# Patient Record
Sex: FEMALE | Race: WHITE | NOT HISPANIC OR LATINO | Employment: OTHER | URBAN - METROPOLITAN AREA
[De-identification: names, ages, dates, MRNs, and addresses within clinical notes are randomized per-mention and may not be internally consistent; named-entity substitution may affect disease eponyms.]

---

## 2019-09-10 PROBLEM — G20 PARKINSON DISEASE (HCC): Chronic | Status: ACTIVE | Noted: 2019-09-10

## 2019-09-11 ENCOUNTER — TELEPHONE (OUTPATIENT)
Dept: INTERNAL MEDICINE CLINIC | Facility: CLINIC | Age: 79
End: 2019-09-11

## 2019-09-11 ENCOUNTER — OFFICE VISIT (OUTPATIENT)
Dept: INTERNAL MEDICINE CLINIC | Facility: CLINIC | Age: 79
End: 2019-09-11
Payer: COMMERCIAL

## 2019-09-11 VITALS — SYSTOLIC BLOOD PRESSURE: 130 MMHG | HEART RATE: 81 BPM | DIASTOLIC BLOOD PRESSURE: 80 MMHG | OXYGEN SATURATION: 95 %

## 2019-09-11 DIAGNOSIS — Z00.00 ADULT GENERAL MEDICAL EXAMINATION: ICD-10-CM

## 2019-09-11 DIAGNOSIS — Z99.3 WHEELCHAIR BOUND: ICD-10-CM

## 2019-09-11 DIAGNOSIS — F33.2 SEVERE EPISODE OF RECURRENT MAJOR DEPRESSIVE DISORDER, WITHOUT PSYCHOTIC FEATURES (HCC): Primary | Chronic | ICD-10-CM

## 2019-09-11 DIAGNOSIS — F41.9 ANXIETY: ICD-10-CM

## 2019-09-11 DIAGNOSIS — G20 PARKINSON DISEASE (HCC): Chronic | ICD-10-CM

## 2019-09-11 DIAGNOSIS — Z23 NEED FOR INFLUENZA VACCINATION: ICD-10-CM

## 2019-09-11 DIAGNOSIS — F51.01 PRIMARY INSOMNIA: ICD-10-CM

## 2019-09-11 PROCEDURE — G0008 ADMIN INFLUENZA VIRUS VAC: HCPCS | Performed by: INTERNAL MEDICINE

## 2019-09-11 PROCEDURE — 99204 OFFICE O/P NEW MOD 45 MIN: CPT | Performed by: INTERNAL MEDICINE

## 2019-09-11 PROCEDURE — 90662 IIV NO PRSV INCREASED AG IM: CPT | Performed by: INTERNAL MEDICINE

## 2019-09-11 RX ORDER — SERTRALINE HYDROCHLORIDE 25 MG/1
25 TABLET, FILM COATED ORAL DAILY
COMMUNITY
End: 2019-09-11 | Stop reason: SDUPTHER

## 2019-09-11 RX ORDER — LANOLIN ALCOHOL/MO/W.PET/CERES
6 CREAM (GRAM) TOPICAL
Qty: 60 TABLET | Refills: 5 | Status: SHIPPED | OUTPATIENT
Start: 2019-09-11 | End: 2019-09-27 | Stop reason: SDUPTHER

## 2019-09-11 RX ORDER — ACETAMINOPHEN 325 MG/1
325 TABLET ORAL EVERY 6 HOURS PRN
Qty: 90 TABLET | Refills: 5 | Status: SHIPPED | OUTPATIENT
Start: 2019-09-11 | End: 2022-01-01 | Stop reason: SDUPTHER

## 2019-09-11 RX ORDER — LORATADINE 10 MG/1
10 TABLET ORAL DAILY
Qty: 30 TABLET | Refills: 5 | Status: SHIPPED | OUTPATIENT
Start: 2019-09-11 | End: 2020-01-09 | Stop reason: ALTCHOICE

## 2019-09-11 RX ORDER — THIAMINE MONONITRATE (VIT B1) 100 MG
100 TABLET ORAL DAILY
COMMUNITY
End: 2019-09-11 | Stop reason: SDUPTHER

## 2019-09-11 RX ORDER — LANOLIN ALCOHOL/MO/W.PET/CERES
3 CREAM (GRAM) TOPICAL
COMMUNITY
End: 2019-09-11 | Stop reason: SDUPTHER

## 2019-09-11 RX ORDER — ACETAMINOPHEN 325 MG/1
325 TABLET ORAL EVERY 6 HOURS PRN
COMMUNITY
End: 2019-09-11 | Stop reason: SDUPTHER

## 2019-09-11 RX ORDER — PROPRANOLOL HYDROCHLORIDE 20 MG/1
20 TABLET ORAL 2 TIMES DAILY
Qty: 60 TABLET | Refills: 5 | Status: SHIPPED | OUTPATIENT
Start: 2019-09-11

## 2019-09-11 RX ORDER — ZINC SULFATE 50(220)MG
220 CAPSULE ORAL DAILY
Qty: 30 CAPSULE | Refills: 5 | Status: SHIPPED | OUTPATIENT
Start: 2019-09-11

## 2019-09-11 RX ORDER — PROPRANOLOL HYDROCHLORIDE 20 MG/1
20 TABLET ORAL 2 TIMES DAILY
COMMUNITY
End: 2019-09-11 | Stop reason: SDUPTHER

## 2019-09-11 RX ORDER — CARBIDOPA/LEVODOPA 25MG-250MG
1 TABLET ORAL 3 TIMES DAILY
Qty: 90 TABLET | Refills: 5 | Status: SHIPPED | OUTPATIENT
Start: 2019-09-11 | End: 2020-01-09 | Stop reason: DRUGHIGH

## 2019-09-11 RX ORDER — THIAMINE MONONITRATE (VIT B1) 100 MG
100 TABLET ORAL DAILY
Qty: 30 TABLET | Refills: 5 | Status: SHIPPED | OUTPATIENT
Start: 2019-09-11

## 2019-09-11 RX ORDER — BUSPIRONE HYDROCHLORIDE 5 MG/1
5 TABLET ORAL 3 TIMES DAILY
Qty: 90 TABLET | Refills: 5 | Status: SHIPPED | OUTPATIENT
Start: 2019-09-11 | End: 2022-01-01 | Stop reason: SDUPTHER

## 2019-09-11 RX ORDER — LORAZEPAM 0.5 MG/1
0.5 TABLET ORAL
Qty: 30 TABLET | Refills: 3 | Status: SHIPPED | OUTPATIENT
Start: 2019-09-11 | End: 2019-09-27 | Stop reason: SDUPTHER

## 2019-09-11 RX ORDER — CARBIDOPA AND LEVODOPA 25; 100 MG/1; MG/1
1 TABLET, EXTENDED RELEASE ORAL
Qty: 30 TABLET | Refills: 5 | Status: SHIPPED | OUTPATIENT
Start: 2019-09-11 | End: 2019-09-27 | Stop reason: SDUPTHER

## 2019-09-11 RX ORDER — BUSPIRONE HYDROCHLORIDE 5 MG/1
5 TABLET ORAL 3 TIMES DAILY
COMMUNITY
End: 2019-09-11 | Stop reason: SDUPTHER

## 2019-09-11 RX ORDER — SERTRALINE HYDROCHLORIDE 25 MG/1
25 TABLET, FILM COATED ORAL DAILY
Qty: 30 TABLET | Refills: 5 | Status: SHIPPED | OUTPATIENT
Start: 2019-09-11 | End: 2020-04-10 | Stop reason: SDUPTHER

## 2019-09-11 RX ORDER — LANOLIN ALCOHOL/MO/W.PET/CERES
3 CREAM (GRAM) TOPICAL
Qty: 60 TABLET | Refills: 5 | Status: SHIPPED | OUTPATIENT
Start: 2019-09-11 | End: 2019-09-11

## 2019-09-11 RX ORDER — ZINC SULFATE 50(220)MG
220 CAPSULE ORAL DAILY
COMMUNITY
End: 2019-09-11 | Stop reason: SDUPTHER

## 2019-09-11 NOTE — PATIENT INSTRUCTIONS
Fall Prevention   WHAT YOU NEED TO KNOW:   Fall prevention includes ways to make your home and other areas safer  It also includes ways you can move more carefully to prevent a fall  Health conditions that cause changes in your blood pressure, vision, or muscle strength and coordination may increase your risk for falls  Medicines may also increase your risk for falls if they make you dizzy, weak, or sleepy  DISCHARGE INSTRUCTIONS:   Call 911 or have someone else call if:   · You have fallen and are unconscious  · You have fallen and cannot move part of your body  Contact your healthcare provider if:   · You have fallen and have pain or a headache  · You have questions or concerns about your condition or care  Fall prevention tips:   · Stand or sit up slowly  This may help you keep your balance and prevent falls  · Use assistive devices as directed  Your healthcare provider may suggest that you use a cane or walker to help you keep your balance  You may need to have grab bars put in your bathroom near the toilet or in the shower  · Wear shoes that fit well and have soles that   Wear shoes both inside and outside  Use slippers with good   Do not wear shoes with high heels  · Wear a personal alarm  This is a device that allows you to call 911 if you fall and need help  Ask your healthcare provider for more information  · Stay active  Exercise can help strengthen your muscles and improve your balance  Your healthcare provider may recommend water aerobics or walking  He or she may also recommend physical therapy to improve your coordination  Never start an exercise program without talking to your healthcare provider first      · Manage your medical conditions  Keep all appointments with your healthcare providers  Visit your eye doctor as directed  Home safety tips:   · Add items to prevent falls in the bathroom    Put nonslip strips on your bath or shower floor to prevent you from slipping  Use a bath mat if you do not have carpet in the bathroom  This will prevent you from falling when you step out of the bath or shower  Use a shower seat so you do not need to stand while you shower  Sit on the toilet or a chair in your bathroom to dry yourself and put on clothing  This will prevent you from losing your balance from drying or dressing yourself while you are standing  · Keep paths clear  Remove books, shoes, and other objects from walkways and stairs  Place cords for telephones and lamps out of the way so that you do not need to walk over them  Tape them down if you cannot move them  Remove small rugs  If you cannot remove a rug, secure it with double-sided tape  This will prevent you from tripping  · Install bright lights in your home  Use night lights to help light paths to the bathroom or kitchen  Always turn on the light before you start walking  · Keep items you use often on shelves within reach  Do not use a step stool to help you reach an item  · Paint or place reflective tape on the edges of your stairs  This will help you see the stairs better  Follow up with your healthcare provider as directed:  Write down your questions so you remember to ask them during your visits  © 2017 [employer] Information is for End User's use only and may not be sold, redistributed or otherwise used for commercial purposes  All illustrations and images included in CareNotes® are the copyrighted property of A D A M , Inc  or Tino Moraes  The above information is an  only  It is not intended as medical advice for individual conditions or treatments  Talk to your doctor, nurse or pharmacist before following any medical regimen to see if it is safe and effective for you

## 2019-09-11 NOTE — PROGRESS NOTES
INTERNAL MEDICINE INITIAL OFFICE VISIT  [facility] - [facility] OF [address] [place] [address]    NAME: Kandice Montemayor  AGE: 67 y o  SEX: female  : 1940     DATE: 2019     Assessment and Plan:     1  Severe episode of recurrent major depressive disorder, without psychotic features ([address] )    She should continue sertraline as prescribed  She could benefit from cognitive behavorial therapy  Usually see this with Parkinson  - sertraline (ZOLOFT) 25 mg tablet; Take 1 tablet (25 mg total) by mouth daily  Dispense: 30 tablet; Refill: 5  - CBC; Future  - Comprehensive metabolic panel; Future  - TSH, 3rd generation; Future    2  Parkinson disease [facility])    Will refer to neurology and get set up with home health  Sinemet was refilled to pharmacy  Her dose will be increased  She remain at increased risk for future falls  She should not ambulate without assistance  She would benefit from home PT/OT/skilled nursing/Speech therapy  She meets homebound criteria  - carbidopa-levodopa (SINEMET)  mg per tablet; Take 1 tablet by mouth 3 (three) times a day  Dispense: 90 tablet; Refill: 5  - carbidopa-levodopa (SINEMET CR)  mg TBCR per ER tablet; Take 1 tablet by mouth daily at bedtime  Dispense: 30 tablet; Refill: 5  - Ambulatory referral to Neurology; Future  - Ambulatory Referral to Home Health; Future    3  Anxiety    Continue medications as prescribed  [place] PDMP was [place]  There were no recent prescriptions noted  Will add on low dose lorazepam to help her with anxiety and sleep     - busPIRone (BUSPAR) 5 mg tablet; Take 1 tablet (5 mg total) by mouth 3 (three) times a day  Dispense: 90 tablet; Refill: 5  - propranolol (INDERAL) 20 mg tablet; Take 1 tablet (20 mg total) by mouth 2 (two) times a day  Dispense: 60 tablet; Refill: 5  - LORazepam (ATIVAN) 0 5 mg tablet; Take 1 tablet (0 5 mg total) by mouth daily at bedtime  Dispense: 30 tablet; Refill: 3    4   Primary insomnia    Can continue melatonin as previously prescribed  Would try to avoid napping during day  Use lorazepam prn     - melatonin 3 mg; Take 2 tablet (3 mg total) by mouth daily at bedtime  Dispense: 60 tablet; Refill: 5    5  Wheelchair bound  - Ambulatory Referral to [address] [place] [address]; Future    6  Need for influenza vaccination  - influenza vaccine, 1394-7979, high-dose, PF 0 5 mL (FLUZONE HIGH-DOSE)    7  Adult general medical examination  - zinc sulfate (ZINCATE) 220 mg capsule; Take 1 capsule (220 mg total) by mouth daily  Dispense: 30 capsule; Refill: 5  - thiamine (VITAMIN B1) 100 mg tablet; Take 1 tablet (100 mg total) by mouth daily  Dispense: 30 tablet; Refill: 5  - acetaminophen (TYLENOL) 325 mg tablet; Take 1 tablet (325 mg total) by mouth every 6 (six) hours as needed for mild pain  Dispense: 90 tablet; Refill: 5  - loratadine (CLARITIN) 10 mg tablet; Take 1 tablet (10 mg total) by mouth daily  Dispense: 30 tablet; Refill: 5    BMI Counseling: BMI was not able to be calculated due to patient refusing height and/or weight  Return in about 2 months (around 11/11/2019) for Initial AWV  Chief Complaint:     Chief Complaint   Patient presents with   [facility]     new pt      History of Present Illness:     Patient presents to establish care with myself  Currently, she is living at [facility]  Her medical history and medications were reviewed with patient's daughter  She has underlying history of Parkinson disease, major recurrent depression, anxiety, insomnia, gait disturbance/wheelchair bound  She has very little muscle mass  Was living in [place] and was admitted to hospital in the summer due to fall and deconditioning  She was discharged to short term rehab and now she is residing in [address] [place] [address]  Her daughter needs to get her set up with neurology and PCP  She is concerned that Parkinson disease is worsening  She has poor food intake at times and has been losing weight   She is not currently supplementing with boost or ensure  Patient denies any significant pain, muscle spasms, or muscle rigidity  She does not ambulate  She requires assistance to ambulate and is mostly sedentary/wheelchair bound  Sometimes has difficulty swallowing her food  She does not feel hungry much of the time  Anxiety has worsened living in a new home and she has had difficulty sleeping  Denies cogwheel rigidity  Denies involuntary contractions  Her  also has Parkinson disease  Denies any dementia diagnosis  Denies any history of heart attack or stroke  No recent studies are available for review  She has been hospitalized in the past as well for low sodium and had to be admitted to ICU  This was likely due to poor solute intake from what it sounds like  Review of Systems:     Review of Systems   Constitutional: Positive for activity change, appetite change and unexpected weight change (Weight loss)  Negative for chills, fatigue and fever  HENT: Negative for congestion, hearing loss, postnasal drip, rhinorrhea, sore throat, tinnitus and trouble swallowing  Eyes: Negative for pain, discharge, redness and visual disturbance  Respiratory: Negative for cough, chest tightness, shortness of breath and wheezing  Cardiovascular: Negative for chest pain, palpitations and leg swelling  Gastrointestinal: Negative for abdominal distention, abdominal pain, blood in stool, constipation, diarrhea, nausea and vomiting  Endocrine: Negative for cold intolerance, heat intolerance, polydipsia, polyphagia and polyuria  Genitourinary: Negative for difficulty urinating, dysuria, frequency, hematuria and urgency  Musculoskeletal: Positive for gait problem  Negative for arthralgias, back pain, joint swelling, myalgias, neck pain and neck stiffness  Skin: Negative for color change and rash  Neurological: Positive for tremors and weakness   Negative for dizziness, seizures, syncope, speech difficulty, light-headedness, numbness and headaches  Hematological: Negative for adenopathy  Does not bruise/bleed easily  Psychiatric/Behavioral: Positive for behavioral problems and sleep disturbance  Negative for agitation, confusion, hallucinations and suicidal ideas  The patient is nervous/anxious  Past Medical History:     Past Medical History:   Diagnosis Date    Anxiety     Depressed     Gait disturbance     Parkinson disease ([address] )       Past Surgical History:     Past Surgical History:   Procedure Laterality Date    SPINAL FUSION  1960      Social History:   She reports that she has never smoked  She has never used smokeless tobacco  She reports that she drinks alcohol  She reports that she has current or past drug history  Family History:     Family History   Problem Relation Age of Onset    Parkinsonism Family       Current Medications:     Current Outpatient Medications:     acetaminophen (TYLENOL) 325 mg tablet, Take 325 mg by mouth every 6 (six) hours as needed for mild pain, Disp: , Rfl:     busPIRone (BUSPAR) 5 mg tablet, Take 5 mg by mouth 3 (three) times a day, Disp: , Rfl:     carbidopa-levodopa (SINEMET)  mg per tablet, Take 1 tablet by mouth daily, Disp: , Rfl:     melatonin 3 mg, Take 3 mg by mouth daily at bedtime, Disp: , Rfl:     propranolol (INDERAL) 20 mg tablet, Take 20 mg by mouth 2 (two) times a day, Disp: , Rfl:     sertraline (ZOLOFT) 25 mg tablet, Take 25 mg by mouth daily, Disp: , Rfl:     thiamine (VITAMIN B1) 100 mg tablet, Take 100 mg by mouth daily, Disp: , Rfl:     zinc sulfate (ZINCATE) 220 mg capsule, Take 220 mg by mouth daily, Disp: , Rfl:      Allergies: Allergies   Allergen Reactions    Penicillins Itching      Physical Exam:     /80 (BP Location: Left arm, Patient Position: Sitting, Cuff Size: Standard)   Pulse 81   SpO2 95%     Physical Exam   Constitutional: She appears well-developed  No distress     Appears malnourished/low body weight   Eyes: Conjunctivae are normal  Right eye exhibits no discharge  Left eye exhibits no discharge  No scleral icterus  Neck: Neck supple  No JVD present  No thyromegaly present  Cardiovascular: Normal rate, regular rhythm and intact distal pulses  No murmur heard  Pulmonary/Chest: Effort normal and breath sounds normal  No respiratory distress  She has no wheezes  She has no rales  She exhibits no tenderness  Abdominal: Soft  Bowel sounds are normal  She exhibits no distension  There is no tenderness  There is no guarding  Musculoskeletal: Normal range of motion  She exhibits no edema  Lymphadenopathy:     She has no cervical adenopathy  Neurological: She is alert  No cranial nerve deficit  No cogwheel rigidity  Resting tremor  Poor muscle mass   Skin: Skin is warm and dry  She is not diaphoretic  Psychiatric: She has a normal mood and affect  Her behavior is normal    Vitals reviewed      Kalie Vargas DO  [facility]

## 2019-09-12 ENCOUNTER — TELEPHONE (OUTPATIENT)
Dept: INTERNAL MEDICINE CLINIC | Facility: CLINIC | Age: 79
End: 2019-09-12

## 2019-09-12 NOTE — TELEPHONE ENCOUNTER
Mumtaz from [address] called in regards to Bar Garcia, she stated that Bar Garcia resides at [facility]  She requested that we fax over the referral for Home Health there because they work in conjunction with [facility] and also [facility]  They have everything that the patient would need   I faxed referral over to Fax#  [phone]    Any questions please contact Kierra at  [phone]

## 2019-09-27 DIAGNOSIS — G20 PARKINSON DISEASE (HCC): Chronic | ICD-10-CM

## 2019-09-27 DIAGNOSIS — F41.9 ANXIETY: ICD-10-CM

## 2019-09-27 DIAGNOSIS — F51.01 PRIMARY INSOMNIA: ICD-10-CM

## 2019-09-27 RX ORDER — LANOLIN ALCOHOL/MO/W.PET/CERES
6 CREAM (GRAM) TOPICAL
Qty: 60 TABLET | Refills: 5 | Status: SHIPPED | OUTPATIENT
Start: 2019-09-27

## 2019-09-27 RX ORDER — CARBIDOPA AND LEVODOPA 25; 100 MG/1; MG/1
1 TABLET, EXTENDED RELEASE ORAL
Qty: 30 TABLET | Refills: 5 | Status: SHIPPED | OUTPATIENT
Start: 2019-09-27 | End: 2020-05-21 | Stop reason: SDUPTHER

## 2019-09-27 RX ORDER — LORAZEPAM 0.5 MG/1
0.5 TABLET ORAL
Qty: 30 TABLET | Refills: 3 | Status: SHIPPED | OUTPATIENT
Start: 2019-09-27 | End: 2020-01-07 | Stop reason: SDUPTHER

## 2019-09-27 NOTE — TELEPHONE ENCOUNTER
Saira Doshi from the visiting nurses       Patient is in a nursing home , they need new RX  of patients medication with instructions for:    carbidopa-levodopa (SINEMET CR)  mg  LORazepam (ATIVAN) 0 5 mg      melatonin 3 mg       Fax number is [phone] att: Letty Christianson

## 2019-10-02 ENCOUNTER — TELEPHONE (OUTPATIENT)
Dept: INTERNAL MEDICINE CLINIC | Facility: CLINIC | Age: 79
End: 2019-10-02

## 2019-10-02 DIAGNOSIS — K59.09 OTHER CONSTIPATION: Primary | ICD-10-CM

## 2019-10-02 RX ORDER — DOCUSATE SODIUM 100 MG/1
100 CAPSULE, LIQUID FILLED ORAL 2 TIMES DAILY PRN
Qty: 60 CAPSULE | Refills: 5 | Status: SHIPPED | OUTPATIENT
Start: 2019-10-02 | End: 2020-01-09 | Stop reason: ALTCHOICE

## 2019-10-02 NOTE — TELEPHONE ENCOUNTER
Need a verbal for speech therapy they are running a little late getting out to see her  They need the verbal within the next 7 days      Please contact Adam Bowman with a verbal at  [phone]

## 2019-10-02 NOTE — TELEPHONE ENCOUNTER
Rose Mary from [facility] visiting nurses called stating that she went to see Berkley Morales on Sat 9/27 and she had diarrhea  She told Eber Vo that she had taken  imodium and has not had a bowel movement in 4 days      Eber Vo is requesting that Dr Josefina Abdullahi fax over either  colace or miralax to [facility] [address]  Phone [phone]      Any questions for Eber Vo please contact her at   [phone]

## 2019-10-22 ENCOUNTER — TELEPHONE (OUTPATIENT)
Dept: NEUROLOGY | Facility: CLINIC | Age: 79
End: 2019-10-22

## 2019-11-04 ENCOUNTER — TELEPHONE (OUTPATIENT)
Dept: INTERNAL MEDICINE CLINIC | Facility: CLINIC | Age: 79
End: 2019-11-04

## 2019-11-04 NOTE — TELEPHONE ENCOUNTER
Pt's last appt was 9/11  Gaspar Nyhan from [facility] would like a discontinue order if the dr is discontinuing: docusate sodium (COLACE), 100 mg capsule, 100 mg, 2 times daily PRN  It's still on [facility] [place]'s med list for the pt       F# is: [phone]

## 2019-11-06 ENCOUNTER — TELEPHONE (OUTPATIENT)
Dept: NEUROLOGY | Facility: CLINIC | Age: 79
End: 2019-11-06

## 2019-11-06 NOTE — TELEPHONE ENCOUNTER
Best contact number for patient: [phone]    Emergency Contact name and number: Niyah Currie (Same as above)     Referring provider:    Referring provider Dr Maliha Tavarez  Telephone:________________    Primary Care Provider Name:      Reason for Appointment/Dx: parkinsons'     Neurology Location patient would like to be seen: [facility]     Order received? Yes ,new order needs to be put in order is                                                   Records Received? No  If no, explain: daughter will call and get records from previous Neurologist     Have you ever seen another Neurologist? Yes, daughter did not say what [address] [address] [facility] [employer]     ID/Policy #:    Secondary Insurance:    ID/Policy#: Workman's Comp/ Accident/ School  Information      Workman's Comp/Accident/School related?  No    If yes name of Insurance company:    Date of Injury:    Open Claim:no    [address] Name and Telephone Number:    Notes:                   Appointment date:

## 2019-11-07 ENCOUNTER — TELEPHONE (OUTPATIENT)
Dept: INTERNAL MEDICINE CLINIC | Facility: CLINIC | Age: 79
End: 2019-11-07

## 2019-11-07 NOTE — TELEPHONE ENCOUNTER
Patient would like to switch Doctors  From Dr Gonzalez to Dr Ghada Mckenzie    Both the  and daughter see Dr Ghada Mckenzie    Would like one family doctor

## 2019-11-19 ENCOUNTER — TELEPHONE (OUTPATIENT)
Dept: INTERNAL MEDICINE CLINIC | Facility: CLINIC | Age: 79
End: 2019-11-19

## 2019-11-19 DIAGNOSIS — Z99.3 WHEELCHAIR BOUND: Primary | Chronic | ICD-10-CM

## 2019-11-19 DIAGNOSIS — G20 PARKINSON DISEASE (HCC): Chronic | ICD-10-CM

## 2019-11-19 DIAGNOSIS — G20 PARKINSON DISEASE (HCC): ICD-10-CM

## 2019-11-19 DIAGNOSIS — Z99.3 WHEELCHAIR BOUND: Primary | ICD-10-CM

## 2019-11-19 NOTE — TELEPHONE ENCOUNTER
Nanda from [place] [address] [facility] called, pt is declining outpatient physical therapy  She would like to go to [facility] which is in the same facility she is in now  They need an order for physical therapy and pt's demographics   Fax upon completion    Phone [phone]  Fax [phone]

## 2019-11-19 NOTE — TELEPHONE ENCOUNTER
Nanda from [address] [place], [place] [address] called to let us know that patient is done with physical therapy    Maegan Bryant would also like Dr Gonzalez to place an order for a standard wheel chair with gel cushion -patient has been using a transportation wheel chair and it very uncomfortable    Please send to [address]   Fax # [phone] is requesting we call [facility] once faxed over  Phone # [phone]

## 2019-11-20 ENCOUNTER — TELEPHONE (OUTPATIENT)
Dept: INTERNAL MEDICINE CLINIC | Facility: CLINIC | Age: 79
End: 2019-11-20

## 2019-11-21 ENCOUNTER — TELEPHONE (OUTPATIENT)
Dept: INTERNAL MEDICINE CLINIC | Facility: CLINIC | Age: 79
End: 2019-11-21

## 2019-11-21 NOTE — TELEPHONE ENCOUNTER
Nanda from [facility] an order sent to [address] [place] [address] so patient can get the wheelchair delivered by tomorrow,     Order needs to say : patients name, state please supply 16x18 wheelchair w/gel cushion    [facility] did not realize how small the patient was and the 1st wheelchair would not have worked    Fax # [phone] phone # [phone]

## 2019-11-21 NOTE — TELEPHONE ENCOUNTER
Patient is being DC from [address] [place], [place] [address]A today 11/21- out patient is too much for patient, this would be more convenient     Jaems Bowman is asking that we send an order to [employer] would be Dilshad Mascorro at [facility] # [phone]  Fax # [phone]    Needs intake form, diagnosis (parkinsons & weakness)

## 2019-12-05 ENCOUNTER — APPOINTMENT (OUTPATIENT)
Dept: LAB | Facility: CLINIC | Age: 79
End: 2019-12-05
Payer: COMMERCIAL

## 2019-12-05 ENCOUNTER — TELEPHONE (OUTPATIENT)
Dept: INTERNAL MEDICINE CLINIC | Facility: CLINIC | Age: 79
End: 2019-12-05

## 2019-12-05 ENCOUNTER — OFFICE VISIT (OUTPATIENT)
Dept: INTERNAL MEDICINE CLINIC | Facility: CLINIC | Age: 79
End: 2019-12-05
Payer: COMMERCIAL

## 2019-12-05 VITALS
HEART RATE: 67 BPM | OXYGEN SATURATION: 98 % | WEIGHT: 64.6 LBS | SYSTOLIC BLOOD PRESSURE: 98 MMHG | DIASTOLIC BLOOD PRESSURE: 60 MMHG

## 2019-12-05 DIAGNOSIS — F33.2 SEVERE EPISODE OF RECURRENT MAJOR DEPRESSIVE DISORDER, WITHOUT PSYCHOTIC FEATURES (HCC): Chronic | ICD-10-CM

## 2019-12-05 DIAGNOSIS — G20 PARKINSON DISEASE (HCC): Primary | Chronic | ICD-10-CM

## 2019-12-05 LAB
ALBUMIN SERPL BCP-MCNC: 4.2 G/DL (ref 3.5–5)
ALP SERPL-CCNC: 83 U/L (ref 46–116)
ALT SERPL W P-5'-P-CCNC: 14 U/L (ref 12–78)
ANION GAP SERPL CALCULATED.3IONS-SCNC: 5 MMOL/L (ref 4–13)
AST SERPL W P-5'-P-CCNC: 16 U/L (ref 5–45)
BILIRUB SERPL-MCNC: 0.52 MG/DL (ref 0.2–1)
BUN SERPL-MCNC: 17 MG/DL (ref 5–25)
CALCIUM SERPL-MCNC: 9.1 MG/DL (ref 8.3–10.1)
CHLORIDE SERPL-SCNC: 106 MMOL/L (ref 100–108)
CO2 SERPL-SCNC: 28 MMOL/L (ref 21–32)
CREAT SERPL-MCNC: 0.51 MG/DL (ref 0.6–1.3)
ERYTHROCYTE [DISTWIDTH] IN BLOOD BY AUTOMATED COUNT: 12.5 % (ref 11.6–15.1)
GFR SERPL CREATININE-BSD FRML MDRD: 92 ML/MIN/1.73SQ M
GLUCOSE P FAST SERPL-MCNC: 98 MG/DL (ref 65–99)
HCT VFR BLD AUTO: 35.9 % (ref 34.8–46.1)
HGB BLD-MCNC: 11.5 G/DL (ref 11.5–15.4)
MCH RBC QN AUTO: 30.9 PG (ref 26.8–34.3)
MCHC RBC AUTO-ENTMCNC: 32 G/DL (ref 31.4–37.4)
MCV RBC AUTO: 97 FL (ref 82–98)
PLATELET # BLD AUTO: 287 THOUSANDS/UL (ref 149–390)
PMV BLD AUTO: 9.1 FL (ref 8.9–12.7)
POTASSIUM SERPL-SCNC: 3.7 MMOL/L (ref 3.5–5.3)
PROT SERPL-MCNC: 6.8 G/DL (ref 6.4–8.2)
RBC # BLD AUTO: 3.72 MILLION/UL (ref 3.81–5.12)
SODIUM SERPL-SCNC: 139 MMOL/L (ref 136–145)
TSH SERPL DL<=0.05 MIU/L-ACNC: 0.47 UIU/ML (ref 0.36–3.74)
WBC # BLD AUTO: 5.97 THOUSAND/UL (ref 4.31–10.16)

## 2019-12-05 PROCEDURE — 36415 COLL VENOUS BLD VENIPUNCTURE: CPT

## 2019-12-05 PROCEDURE — 85027 COMPLETE CBC AUTOMATED: CPT

## 2019-12-05 PROCEDURE — 84443 ASSAY THYROID STIM HORMONE: CPT

## 2019-12-05 PROCEDURE — 99213 OFFICE O/P EST LOW 20 MIN: CPT | Performed by: INTERNAL MEDICINE

## 2019-12-05 PROCEDURE — 80053 COMPREHEN METABOLIC PANEL: CPT

## 2019-12-05 NOTE — PROGRESS NOTES
INTERNAL MEDICINE FOLLOW-UP OFFICE VISIT  [facility] - [facility]    NAME: Sera Napoles  AGE: 67 y o  SEX: female  : 1940     DATE: 2019     Assessment and Plan:     1  Parkinson disease [facility])    Given referral to alternative neurologist  Reprinted lab work to get done  Continue current dose of Sinemet  - Ambulatory referral to Neurology; Future    Return in about 4 months (around 2020) for Subsequent AWV, Follow-up  Chief Complaint:     Chief Complaint   Patient presents with    Medication Problem     *review        History of Present Illness:     Patient has been more fatigued lately  She is underweight and has parkinson disease and severe depression  She does ok most of the day but then her energy level really falls off by 6 pm and she needs to go to bed  She has some dyskinesias at night but not during day  She denies any restless leg symptoms  She denies any hallucinations  Daughter has had difficulty getting her in with neurologist with [facility]  She has a low volume, expressionless voice  Review of Systems:     Review of Systems   Constitutional: Positive for activity change and fatigue  Respiratory: Negative  Cardiovascular: Negative  Gastrointestinal: Negative  Musculoskeletal: Positive for gait problem  Neurological: Positive for weakness  Problem List:     Patient Active Problem List   Diagnosis    Parkinson disease ([facility] )    Severe episode of recurrent major depressive disorder, without psychotic features ([facility] )    Wheelchair bound    Anxiety    Primary insomnia      Objective:     BP 98/60 (BP Location: Left arm, Patient Position: Sitting, Cuff Size: Standard)   Pulse 67   Wt 29 3 kg (64 lb 9 6 oz) Comment: w/ shoes denied off  SpO2 98%     Physical Exam   Constitutional: No distress  Underweight, cachetic female   Cardiovascular: Normal rate and regular rhythm     Pulmonary/Chest: Effort normal and breath sounds normal    Abdominal: Soft  Bowel sounds are normal    Musculoskeletal: She exhibits no edema  Neurological: She is alert  Bradykinesia; low volume voice; no resting tremor or muscle rigidity   Skin: She is not diaphoretic       April Cardenas DO  [facility] OF [address] [place] [address]

## 2019-12-05 NOTE — TELEPHONE ENCOUNTER
----- Message from Caryn Olvera DO sent at 12/5/2019  2:56 PM EST -----  Call patient and let her know labs are all normal

## 2019-12-05 NOTE — PATIENT INSTRUCTIONS
Parkinson Disease   WHAT YOU NEED TO KNOW:   What is Parkinson disease (PD)? PD is a long-term movement disorder  The brain cells that control movement start to die and cause changes in how you move, feel, and act  Even though PD may progress and have a severe impact on your daily life, it is not a life-threatening disease  What causes PD? The exact cause of PD is unknown  It may be caused by a problem with how your brain works  A chemical called dopamine helps your brain control your movement, thoughts, and feelings  PD causes brain cells that make dopamine to die, so they cannot make enough dopamine  What increases my risk for PD? · Age 61 years or older    · A family history of PD    · Exposure to chemicals, such as pesticides or herbicides  What are the signs and symptoms of PD? Symptoms often increase and get worse over time  You may have any of the following:  · Tremors (shaking) that go away when you move or sleep    · Trouble moving or getting up from a seated position    · Trouble with small movements, such as buttoning clothing or eating    · Less blinking and facial emotion    · Joint stiffness and jerky movement    · Trouble keeping your balance when standing or changing positions    · Shuffling or hunched position while walking    · Trouble speaking and writing  How is PD diagnosed? Your healthcare provider will ask about your signs and symptoms  He will ask about your medical history and if you have family members with PD  He will examine you and may move your arms or legs to test your muscles  He may check your balance and the way you walk  How is PD treated? There is no cure for PD  The goal of treatment is to help manage your symptoms  You may need any of the following:  · Anti-Parkinson medicines  are used to improve movement problems, such as muscle stiffness, twitches, and restlessness   Your healthcare provider may use several types of this medicine to help manage your symptoms  · Botulinum toxin  may be given as an injection into your muscles to help them relax and be less stiff  · Surgery  may be used to place an electrical device inside your brain during surgery called deep brain stimulation  Deep brain stimulation may help to decrease symptoms, such as tremor and rigidity  Once the device is in your brain, you may turn the device on or off whenever you want  How can I manage PD? · Do not eat foods that are high in protein or dairy  They can cause problems with how some of your medicine works  Ask your healthcare provider how much protein and dairy is safe to eat  He may tell you to eat foods high in fiber to make it easier to have a bowel movement  Examples are cereals, beans, vegetables, and whole-grain breads  Ask if you need to be on a special diet  · Do not drive  unless your healthcare provider says it is okay  · Exercise regularly  A physical therapist teaches you exercises to help improve movement and strength, and to decrease pain  This may help you control your body movements, and keep your balance  · Go to occupational therapy  An occupational therapist teaches you skills to help with your daily activities  Your occupational therapist may help you choose equipment to help you at home and work  He can also suggest ways to keep your home and workplace safe  · Go to speech therapy  A speech therapist may work with you to help you improve your ability to talk or swallow  · Go to counseling  A mental health counselor can help you talk about your feeling about PD  Your family may attend meetings to learn new ways to take better care of both you and themselves  Where can I find support and more information? · [employer]  [address] , [address]  Phone: 7- [phone] - [phone]  Web Address: [employer]  org   · [address], [place] , [address] [place] [address],  [address]  [address] [place] [address] , [address]   Phone: 2- [phone]  Phone: [phone]  Web Address: http://[employer]/  org  When should I seek immediate care? · You feel like hurting or killing yourself or others  · You feel lightheaded, dizzy, or faint  · You have chest pain or shortness of breath  · You have weakness in an arm or leg  · You become confused, or you have difficulty speaking  · You have dizziness, a severe headache, or vision changes  When should I contact my healthcare provider? · You have a fever  · You are not sleeping well or you sleep more than usual     · You cannot eat or are eating more than usual     · You feel that your condition is getting worse  · You have new symptoms since your last appointment  · Your sad feelings or thoughts change the way you function during the day  · You have questions or concerns about your condition or care  CARE AGREEMENT:   You have the right to help plan your care  Learn about your health condition and how it may be treated  Discuss treatment options with your caregivers to decide what care you want to receive  You always have the right to refuse treatment  The above information is an  only  It is not intended as medical advice for individual conditions or treatments  Talk to your doctor, nurse or pharmacist before following any medical regimen to see if it is safe and effective for you  © 2017 [address] Information is for End User's use only and may not be sold, redistributed or otherwise used for commercial purposes  All illustrations and images included in CareNotes® are the copyrighted property of [employer]  or Tino Moares

## 2020-01-07 ENCOUNTER — TELEPHONE (OUTPATIENT)
Dept: NEUROLOGY | Facility: CLINIC | Age: 80
End: 2020-01-07

## 2020-01-07 DIAGNOSIS — F41.9 ANXIETY: ICD-10-CM

## 2020-01-07 RX ORDER — LORAZEPAM 0.5 MG/1
0.5 TABLET ORAL
Qty: 30 TABLET | Refills: 3 | Status: SHIPPED | OUTPATIENT
Start: 2020-01-07 | End: 2020-03-31

## 2020-01-07 NOTE — TELEPHONE ENCOUNTER
Called patient to see if she would be available to come in this Thursday January 9 at 230 to see Dr Sandy Zuniga  No answer  Left voicemail

## 2020-01-09 ENCOUNTER — CONSULT (OUTPATIENT)
Dept: NEUROLOGY | Facility: CLINIC | Age: 80
End: 2020-01-09
Payer: COMMERCIAL

## 2020-01-09 VITALS — DIASTOLIC BLOOD PRESSURE: 62 MMHG | SYSTOLIC BLOOD PRESSURE: 92 MMHG | HEART RATE: 60 BPM

## 2020-01-09 DIAGNOSIS — F33.2 SEVERE EPISODE OF RECURRENT MAJOR DEPRESSIVE DISORDER, WITHOUT PSYCHOTIC FEATURES (HCC): Chronic | ICD-10-CM

## 2020-01-09 DIAGNOSIS — G20 PARKINSON DISEASE (HCC): Chronic | ICD-10-CM

## 2020-01-09 DIAGNOSIS — Z99.3 WHEELCHAIR BOUND: Primary | Chronic | ICD-10-CM

## 2020-01-09 PROCEDURE — 99204 OFFICE O/P NEW MOD 45 MIN: CPT | Performed by: PSYCHIATRY & NEUROLOGY

## 2020-01-09 NOTE — PROGRESS NOTES
Andrew Perez is a 67 y o  female  Chief Complaint   Patient presents with    Parkinson's Disease       Assessment:  1  Parkinson disease ([facility] [address] )          Discussion:  Would recommend an MRI scan of the brain to evaluate for Parkinson's disease since patient has not had any imaging studies, also discussed with patient, her  and daughter regarding different medication options, including dopamine agonist versus increasing the Sinemet to 4 times a day in addition to the controlled release at night time to see how she does to control her wearing off symptoms, they would like to try increasing the Sinemet to 4 times a day, also we could use amantadine in the future if needed and she was advised to take fall safety and aspiration precautions, she is in follow up with her family physician regarding her history of depression and was advised to take safety precautions and to be under constant supervision, we will try to obtain her records from her neurologist, to go to the hospital if has any worsening symptoms and call me otherwise to see me back 2 months and to follow up with her other physicians      Subjective:    HPI   Patient is here for evaluation of her history of Parkinson's disease for the last 10 years, she is accompanied with her  and daughter, according to the  patient has been diagnosed 10 years back and has been on Sinemet 3 times a day immediate release and she takes a controlled release at nighttime, she has been having some wearing off phenomena and especially after 4 hours she would have tremor, she denies having any hallucinations, sometimes the  has seen that she does have some dyskinesias especially at nighttime but no restless leg  Symptoms, she is mostly wheelchair bound and needs help to go to the bathroom, they live in an assisted living facility [facility], and needs help with her ADLs, she denies any swallowing difficulty, no hallucinations, no vision difficulty, she speaks in the low volume expression less voice, no other complaints      Vitals:    01/09/20 1422   BP: 92/62   BP Location: Left arm   Patient Position: Sitting   Cuff Size: Adult   Pulse: 60       Current Medications    Current Outpatient Medications:     acetaminophen (TYLENOL) 325 mg tablet, Take 1 tablet (325 mg total) by mouth every 6 (six) hours as needed for mild pain, Disp: 90 tablet, Rfl: 5    busPIRone (BUSPAR) 5 mg tablet, Take 1 tablet (5 mg total) by mouth 3 (three) times a day, Disp: 90 tablet, Rfl: 5    carbidopa-levodopa (SINEMET CR)  mg TBCR per ER tablet, Take 1 tablet by mouth daily at bedtime, Disp: 30 tablet, Rfl: 5    carbidopa-levodopa (SINEMET)  mg per tablet, Take 1 tablet by mouth 3 (three) times a day, Disp: 90 tablet, Rfl: 5    LORazepam (ATIVAN) 0 5 mg tablet, Take 1 tablet (0 5 mg total) by mouth daily at bedtime, Disp: 30 tablet, Rfl: 3    melatonin 3 mg, Take 2 tablets (6 mg total) by mouth daily at bedtime, Disp: 60 tablet, Rfl: 5    propranolol (INDERAL) 20 mg tablet, Take 1 tablet (20 mg total) by mouth 2 (two) times a day (Patient taking differently: Take 20 mg by mouth 3 (three) times a day ), Disp: 60 tablet, Rfl: 5    sertraline (ZOLOFT) 25 mg tablet, Take 1 tablet (25 mg total) by mouth daily, Disp: 30 tablet, Rfl: 5    thiamine (VITAMIN B1) 100 mg tablet, Take 1 tablet (100 mg total) by mouth daily, Disp: 30 tablet, Rfl: 5    zinc sulfate (ZINCATE) 220 mg capsule, Take 1 capsule (220 mg total) by mouth daily, Disp: 30 capsule, Rfl: 5      Allergies  Penicillins    Past Medical History  Past Medical History:   Diagnosis Date    Anxiety     Depressed     Gait disturbance     Parkinson disease ([address] )          Past Surgical History:  Past Surgical History:   Procedure Laterality Date    SPINAL FUSION  1960         Family History:  Family History   Problem Relation Age of Onset    Parkinsonism Family        Social History:   reports that she has never smoked  She has never used smokeless tobacco  She reports that she drinks alcohol  She reports that she has current or past drug history  I have reviewed the past medical history, surgical history, social and family history, current medications, allergies vitals, review of systems, and updated this information as appropriate today  Objective:    Physical Exam    Neurological Exam    GENERAL:  Cooperative in no acute distress  HEAD and NECK   Head is atraumatic normocephalic with no lesions or masses  Neck is supple with full range of motion    CARDIOVASCULAR  Carotid Arteries-no carotid bruits  NEUROLOGIC:  Mental Status-the patient is awake alert and oriented without aphasia or apraxia, low volume and expression less speech  Cranial Nerves: Visual fields are full to confrontation  Discs are flat  Limited exam as unable to dilate the pupils, Extraocular movements are full without nystagmus  Pupils are 2-1/2 mm and reactive  Face is symmetrical to light touch  Movements of facial expression move symmetrically  Hearing is normal to finger rub bilaterally  Soft palate lifts symmetrically  Shoulder shrug is symmetrical  Tongue is midline without atrophy  Motor: No drift is noted on arm extension  Strength is full in the upper and lower extremities with cogwheeling rigidity left hand worse than the right hand, mild resting tremor  Sensory: Intact to temperature and vibratory sensation in the upper and lower extremities bilaterally  Cortical function is intact  Coordination: Finger to nose testing is performed accurately  Patient is on a wheelchair  Reflexes:    2+ symmetrical   Toes are downgoing  ROS:  Review of Systems   Constitutional: Positive for fatigue  Negative for appetite change, chills and fever  HENT: Positive for trouble swallowing and voice change (losing voice )  Negative for hearing loss and tinnitus  Eyes: Negative  Negative for photophobia, pain and visual disturbance  Respiratory: Positive for shortness of breath  Negative for wheezing  Cardiovascular: Negative  Negative for chest pain and palpitations  Gastrointestinal: Positive for nausea  Negative for vomiting  Endocrine: Negative  Negative for cold intolerance and heat intolerance  Genitourinary: Positive for frequency  Negative for dysuria and urgency  Loss of bladder control     Musculoskeletal: Positive for gait problem and neck pain  Negative for arthralgias, back pain, myalgias and neck stiffness  Skin: Negative  Negative for rash  Allergic/Immunologic: Negative  Neurological: Positive for dizziness, tremors, weakness and numbness (hands and feet)  Negative for seizures, syncope, facial asymmetry, speech difficulty, light-headedness and headaches  Hematological: Negative  Does not bruise/bleed easily  Psychiatric/Behavioral: Positive for sleep disturbance (difficulty falling asleep)  Negative for confusion, decreased concentration and hallucinations

## 2020-01-20 ENCOUNTER — HOSPITAL ENCOUNTER (OUTPATIENT)
Dept: MRI IMAGING | Facility: CLINIC | Age: 80
Discharge: HOME | End: 2020-01-20
Attending: PSYCHIATRY & NEUROLOGY
Payer: MEDICARE

## 2020-01-20 DIAGNOSIS — G20 PARKINSON DISEASE (HCC): ICD-10-CM

## 2020-01-20 PROCEDURE — 70551 MRI BRAIN STEM W/O DYE: CPT

## 2020-01-22 ENCOUNTER — TELEPHONE (OUTPATIENT)
Dept: NEUROLOGY | Facility: CLINIC | Age: 80
End: 2020-01-22

## 2020-01-22 NOTE — TELEPHONE ENCOUNTER
----- Message from Kalyani Carreon MD sent at 1/22/2020  8:41 AM EST -----  Please call the patient regarding her abnormal result    Please have the patient follow up with family physician regarding fluid in her sinuses

## 2020-01-22 NOTE — TELEPHONE ENCOUNTER
Patient's daughter calling back for result  Provided results  She verbalized understanding and will f/u with her mother

## 2020-02-06 ENCOUNTER — TELEPHONE (OUTPATIENT)
Dept: INTERNAL MEDICINE CLINIC | Facility: CLINIC | Age: 80
End: 2020-02-06

## 2020-02-06 NOTE — TELEPHONE ENCOUNTER
According to Dr Dyan Meza note should still be taking the tablet at bedtime as well as the new prescription he had sent in for 4 times per day

## 2020-02-06 NOTE — TELEPHONE ENCOUNTER
Jl Botello from the [address] [facility] [address] [facility]    Confused of directions for carbidopa-levodopa (SINEMET CR)  mg      She was already taking 1 tablet  at bedtime , but Dr Albrecht put a RX for Take 1 tablet by mouth 4 (four) times a day  Wants to know if she should still take the 1 tablet at bedtime

## 2020-02-19 ENCOUNTER — TELEPHONE (OUTPATIENT)
Dept: NEUROLOGY | Facility: CLINIC | Age: 80
End: 2020-02-19

## 2020-02-19 DIAGNOSIS — G20 PARKINSON DISEASE (HCC): Primary | ICD-10-CM

## 2020-02-19 DIAGNOSIS — R13.10 DYSPHAGIA, UNSPECIFIED TYPE: ICD-10-CM

## 2020-02-19 NOTE — TELEPHONE ENCOUNTER
Pt's  Marsha Lin called and states that pt is c/o worsening spasm (arms and legs)  States that spasms is not a new issue but lately it is more active  Freezing? N/A WC bound    Shuffling? N/A WC bound    Recent Falls? No    Any extra movements? Facial grimacing but mostly just spasm  Any Hallucinations? No    What time of day are symptoms worse? 1500 but varies-after buspar spasms gets better    Current medications confirmed as:  Sinemet  1 tab qid ([id number]-10 pm)    Ask how long after each dose do they feel that it "wears off"? 3-4 hours    Does patient have a DBS? No        [phone]   Spoke w/ pt who gave me permission to speak w/ her   Ok to leave detailed message

## 2020-02-20 ENCOUNTER — APPOINTMENT (EMERGENCY)
Dept: CT IMAGING | Facility: HOSPITAL | Age: 80
End: 2020-02-20
Payer: MEDICARE

## 2020-02-20 ENCOUNTER — HOSPITAL ENCOUNTER (EMERGENCY)
Facility: HOSPITAL | Age: 80
Discharge: HOME | End: 2020-02-20
Attending: EMERGENCY MEDICINE | Admitting: EMERGENCY MEDICINE
Payer: MEDICARE

## 2020-02-20 VITALS
TEMPERATURE: 97.9 F | SYSTOLIC BLOOD PRESSURE: 114 MMHG | HEART RATE: 84 BPM | WEIGHT: 64.81 LBS | OXYGEN SATURATION: 94 % | BODY MASS INDEX: 14.03 KG/M2 | DIASTOLIC BLOOD PRESSURE: 63 MMHG | RESPIRATION RATE: 14 BRPM
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
## 2020-02-20 DIAGNOSIS — R10.9 ABDOMINAL PAIN: ICD-10-CM

## 2020-02-20 DIAGNOSIS — D72.829 LEUKOCYTOSIS: ICD-10-CM

## 2020-02-20 DIAGNOSIS — R19.7 DIARRHEA: ICD-10-CM

## 2020-02-20 LAB
ANION GAP SERPL CALCULATED.3IONS-SCNC: 9 MMOL/L (ref 4–13)
BASOPHILS # BLD AUTO: 0.06 THOUSANDS/ΜL (ref 0–0.1)
BASOPHILS NFR BLD AUTO: 0 % (ref 0–1)
BUN SERPL-MCNC: 17 MG/DL (ref 5–25)
CALCIUM SERPL-MCNC: 9.1 MG/DL (ref 8.3–10.1)
CHLORIDE SERPL-SCNC: 96 MMOL/L (ref 100–108)
CO2 SERPL-SCNC: 28 MMOL/L (ref 21–32)
CREAT SERPL-MCNC: 0.59 MG/DL (ref 0.6–1.3)
EOSINOPHIL # BLD AUTO: 0 THOUSAND/ΜL (ref 0–0.61)
EOSINOPHIL NFR BLD AUTO: 0 % (ref 0–6)
ERYTHROCYTE [DISTWIDTH] IN BLOOD BY AUTOMATED COUNT: 12.6 % (ref 11.6–15.1)
GFR SERPL CREATININE-BSD FRML MDRD: 88 ML/MIN/1.73SQ M
GLUCOSE SERPL-MCNC: 134 MG/DL (ref 65–140)
HCT VFR BLD AUTO: 33 % (ref 34.8–46.1)
HGB BLD-MCNC: 10.5 G/DL (ref 11.5–15.4)
IMM GRANULOCYTES # BLD AUTO: 0.26 THOUSAND/UL (ref 0–0.2)
IMM GRANULOCYTES NFR BLD AUTO: 1 % (ref 0–2)
LYMPHOCYTES # BLD AUTO: 0.56 THOUSANDS/ΜL (ref 0.6–4.47)
LYMPHOCYTES NFR BLD AUTO: 2 % (ref 14–44)
MCH RBC QN AUTO: 31.2 PG (ref 26.8–34.3)
MCHC RBC AUTO-ENTMCNC: 31.8 G/DL (ref 31.4–37.4)
MCV RBC AUTO: 98 FL (ref 82–98)
MONOCYTES # BLD AUTO: 1.91 THOUSAND/ΜL (ref 0.17–1.22)
MONOCYTES NFR BLD AUTO: 6 % (ref 4–12)
NEUTROPHILS # BLD AUTO: 28.71 THOUSANDS/ΜL (ref 1.85–7.62)
NEUTS SEG NFR BLD AUTO: 91 % (ref 43–75)
NRBC BLD AUTO-RTO: 0 /100 WBCS
PLATELET # BLD AUTO: 417 THOUSANDS/UL (ref 149–390)
PMV BLD AUTO: 8.7 FL (ref 8.9–12.7)
POTASSIUM SERPL-SCNC: 4 MMOL/L (ref 3.5–5.3)
RBC # BLD AUTO: 3.37 MILLION/UL (ref 3.81–5.12)
SODIUM SERPL-SCNC: 133 MMOL/L (ref 136–145)
WBC # BLD AUTO: 31.5 THOUSAND/UL (ref 4.31–10.16)

## 2020-02-20 PROCEDURE — 36415 COLL VENOUS BLD VENIPUNCTURE: CPT | Performed by: PHYSICIAN ASSISTANT

## 2020-02-20 PROCEDURE — 99284 EMERGENCY DEPT VISIT MOD MDM: CPT

## 2020-02-20 PROCEDURE — 99285 EMERGENCY DEPT VISIT HI MDM: CPT | Performed by: PHYSICIAN ASSISTANT

## 2020-02-20 PROCEDURE — 70450 CT HEAD/BRAIN W/O DYE: CPT

## 2020-02-20 PROCEDURE — 72125 CT NECK SPINE W/O DYE: CPT

## 2020-02-20 PROCEDURE — 80048 BASIC METABOLIC PNL TOTAL CA: CPT | Performed by: PHYSICIAN ASSISTANT

## 2020-02-20 PROCEDURE — 85025 COMPLETE CBC W/AUTO DIFF WBC: CPT | Performed by: PHYSICIAN ASSISTANT

## 2020-02-20 PROCEDURE — 74177 CT ABD & PELVIS W/CONTRAST: CPT

## 2020-02-20 RX ORDER — CARBIDOPA AND LEVODOPA 25; 100 MG/1; MG/1
1 TABLET, EXTENDED RELEASE ORAL ONCE
Status: COMPLETED | OUTPATIENT
Start: 2020-02-20 | End: 2020-02-20

## 2020-02-20 RX ADMIN — CARBIDOPA AND LEVODOPA 1 TABLET: 25; 100 TABLET, EXTENDED RELEASE ORAL at 17:18

## 2020-02-20 RX ADMIN — IOHEXOL 75 ML: 350 INJECTION, SOLUTION INTRAVENOUS at 15:11

## 2020-02-20 NOTE — ED PROVIDER NOTES
History  Chief Complaint   Patient presents with    Fall     pt brought via EMS from [facility] after a unwitnessed fall this morning at 0100  Per EMS pt c/o LLQ abominal pain since fall  Pt denies head strike, denies LOC, denies thinners  71-year-old female with past medical history significant for Parkinson's disease, anxiety, depression and hypothyroidism presents to the emergency department via EMS with chief complaint of fall  Patient reportedly sustained an unwitnessed fall this morning at 1:00 a m  Alanna Cohen [place] [facility]  She is unaware if she hit her head  She reports no loss of consciousness  EMS reports that since her fall she is complaining of some left lower quadrant abdominal pain  Denies blood thinner use  Location of symptoms reported as the abdomen  Quality is reported as cramping pain  Severity reported as moderate  Associated symptoms:  Denies vomiting  Denies upper lower extremity paralysis paresthesia or weakness  Denies fevers  Denies loss of consciousness  Modifying factors:  Nothing has been tried for treatment of symptoms  Patient does not take blood thinners  Context:pt brought via EMS from [facility] after a unwitnessed fall this morning at 0100  Per EMS pt c/o LLQ abominal pain since fall  Pt denies head strike, denies LOC, denies thinners  Reviewed past visits via epic:  Patient with no prior visits to this ed  History provided by:  Patient   used: No        Prior to Admission Medications   Prescriptions Last Dose Informant Patient Reported? Taking?    LORazepam (ATIVAN) 0 5 mg tablet   No Yes   Sig: Take 1 tablet (0 5 mg total) by mouth daily at bedtime   acetaminophen (TYLENOL) 325 mg tablet  Self No Yes   Sig: Take 1 tablet (325 mg total) by mouth every 6 (six) hours as needed for mild pain   busPIRone (BUSPAR) 5 mg tablet  Self No Yes   Sig: Take 1 tablet (5 mg total) by mouth 3 (three) times a day   carbidopa-levodopa (SINEMET CR)  mg TBCR per ER tablet  Self No Yes   Sig: Take 1 tablet by mouth daily at bedtime   carbidopa-levodopa (SINEMET)  mg per tablet   No Yes   Sig: Take 1 tablet by mouth 4 (four) times a day   melatonin 3 mg  Self No Yes   Sig: Take 2 tablets (6 mg total) by mouth daily at bedtime   propranolol (INDERAL) 20 mg tablet  Self No Yes   Sig: Take 1 tablet (20 mg total) by mouth 2 (two) times a day   Patient taking differently: Take 20 mg by mouth 3 (three) times a day    sertraline (ZOLOFT) 25 mg tablet  Self No Yes   Sig: Take 1 tablet (25 mg total) by mouth daily   thiamine (VITAMIN B1) 100 mg tablet  Self No Yes   Sig: Take 1 tablet (100 mg total) by mouth daily   zinc sulfate (ZINCATE) 220 mg capsule  Self No Yes   Sig: Take 1 capsule (220 mg total) by mouth daily      Facility-Administered Medications: None       Past Medical History:   Diagnosis Date    Anxiety     Depressed     Disease of thyroid gland     Gait disturbance     Parkinson disease ([facility] [address] )        Past Surgical History:   Procedure Laterality Date    SPINAL FUSION  1960       Family History   Problem Relation Age of Onset    Parkinsonism Family      I have reviewed and agree with the history as documented  Social History     Tobacco Use    Smoking status: Never Smoker    Smokeless tobacco: Never Used   Substance Use Topics    Alcohol use: Not Currently     Frequency: Monthly or less     Drinks per session: 1 or 2     Binge frequency: Never    Drug use: Not Currently       Review of Systems   Constitutional: Negative for activity change, appetite change, chills, diaphoresis, fatigue, fever and unexpected weight change  HENT: Negative for congestion, dental problem, drooling, ear discharge, ear pain, facial swelling, hearing loss, mouth sores, nosebleeds, postnasal drip, rhinorrhea, sinus pressure, sinus pain, sneezing, sore throat, tinnitus, trouble swallowing and voice change      Eyes: Negative for photophobia, pain, discharge, redness, itching and visual disturbance  Respiratory: Negative for apnea, cough, choking, chest tightness, shortness of breath, wheezing and stridor  Cardiovascular: Negative for chest pain, palpitations and leg swelling  Gastrointestinal: Positive for abdominal pain  Negative for abdominal distention, anal bleeding, blood in stool, constipation, diarrhea, nausea, rectal pain and vomiting  Endocrine: Negative for cold intolerance, heat intolerance, polydipsia, polyphagia and polyuria  Genitourinary: Negative for decreased urine volume, difficulty urinating, dyspareunia, dysuria, enuresis, flank pain, frequency, hematuria, menstrual problem, pelvic pain, urgency, vaginal bleeding, vaginal discharge and vaginal pain  Musculoskeletal: Negative for arthralgias, back pain, gait problem, joint swelling, myalgias, neck pain and neck stiffness  Skin: Negative for color change, pallor, rash and wound  Allergic/Immunologic: Negative for environmental allergies, food allergies and immunocompromised state  Neurological: Positive for tremors  Negative for dizziness, seizures, syncope, facial asymmetry, speech difficulty, weakness, light-headedness, numbness and headaches  Hematological: Negative for adenopathy  Does not bruise/bleed easily  Psychiatric/Behavioral: Negative for agitation, confusion, hallucinations, self-injury and suicidal ideas  The patient is nervous/anxious  The patient is not hyperactive  All other systems reviewed and are negative  Physical Exam  Physical Exam   Constitutional: She is oriented to person, place, and time  No distress  /60 (BP Location: Right arm)   Pulse 81   Temp 97 9 °F (36 6 °C) (Oral)   Wt 29 4 kg (64 lb 13 oz)   SpO2 99%   BMI 14 03 kg/m²     Thin cachetic 67year old female in NAD  HENT:   Head: Normocephalic and atraumatic     Right Ear: External ear normal    Left Ear: External ear normal    Nose: Nose normal  Mouth/Throat: Oropharynx is clear and moist  No oropharyngeal exudate  Eyes: Conjunctivae and EOM are normal  Right eye exhibits no discharge  Left eye exhibits no discharge  No scleral icterus  Neck: Normal range of motion  Neck supple  No JVD present  No tracheal deviation present  Cardiovascular: Normal rate, regular rhythm and intact distal pulses  Pulmonary/Chest: Effort normal and breath sounds normal  No stridor  No respiratory distress  She has no wheezes  She has no rales  She exhibits no tenderness  Abdominal: Soft  Bowel sounds are normal  She exhibits no distension and no mass  There is tenderness  There is no rebound and no guarding  No hernia  Tender to palpation to lower abdomen  No pulsatile masses  No rigidity or guarding  Musculoskeletal: Normal range of motion  She exhibits no edema, tenderness or deformity  Hips are nontender to palpation bilaterally  External of the lower extremities internal role of lower extremities elicits no pain   Lymphadenopathy:     She has no cervical adenopathy  Neurological: She is alert and oriented to person, place, and time  She displays normal reflexes  No cranial nerve deficit  She exhibits abnormal muscle tone  Coordination normal    Resting tremor   Skin: Skin is warm and dry  Capillary refill takes less than 2 seconds  No rash noted  She is not diaphoretic  No erythema  No pallor  Psychiatric: She has a normal mood and affect  Her behavior is normal  Judgment and thought content normal    Nursing note and vitals reviewed        Vital Signs  ED Triage Vitals   Temperature Pulse Respirations Blood Pressure SpO2   02/20/20 1345 02/20/20 1345 02/20/20 1430 02/20/20 1345 02/20/20 1345   97 9 °F (36 6 °C) 81 15 105/60 99 %      Temp Source Heart Rate Source Patient Position - Orthostatic VS BP Location FiO2 (%)   02/20/20 1345 02/20/20 1345 02/20/20 1345 02/20/20 1345 --   Oral Monitor Lying Right arm       Pain Score       02/20/20 1345 6           Vitals:    02/20/20 1500 02/20/20 1530 02/20/20 1540 02/20/20 1600   BP: 112/67 110/58 106/59 114/63   Pulse: 83 84 81 84   Patient Position - Orthostatic VS:   Lying          Visual Acuity  Visual Acuity      Most Recent Value   L Pupil Size (mm)  2   R Pupil Size (mm)  2          ED Medications  Medications   iohexol (OMNIPAQUE) 350 MG/ML injection (MULTI-DOSE) 100 mL (75 mL Intravenous Given 2/20/20 1511)   carbidopa-levodopa (SINEMET CR)  mg per ER tablet 1 tablet (1 tablet Oral Given 2/20/20 1718)       Diagnostic Studies  Results Reviewed     Procedure Component Value Units Date/Time    CBC and differential [[id number]]  (Abnormal) Collected:  02/20/20 1404    Lab Status:  Final result Specimen:  Blood from Arm, Right Updated:  02/20/20 1501     WBC 31 50 Thousand/uL      RBC 3 37 Million/uL      Hemoglobin 10 5 g/dL      Hematocrit 33 0 %      MCV 98 fL      MCH 31 2 pg      MCHC 31 8 g/dL      RDW 12 6 %      MPV 8 7 fL      Platelets 948 Thousands/uL      nRBC 0 /100 WBCs      Neutrophils Relative 91 %      Immat GRANS % 1 %      Lymphocytes Relative 2 %      Monocytes Relative 6 %      Eosinophils Relative 0 %      Basophils Relative 0 %      Neutrophils Absolute 28 71 Thousands/µL      Immature Grans Absolute 0 26 Thousand/uL      Lymphocytes Absolute 0 56 Thousands/µL      Monocytes Absolute 1 91 Thousand/µL      Eosinophils Absolute 0 00 Thousand/µL      Basophils Absolute 0 06 Thousands/µL     Narrative: This is an appended report  These results have been appended to a previously verified report      Basic metabolic panel [[id number]]  (Abnormal) Collected:  02/20/20 1404    Lab Status:  Final result Specimen:  Blood from Arm, Right Updated:  02/20/20 1430     Sodium 133 mmol/L      Potassium 4 0 mmol/L      Chloride 96 mmol/L      CO2 28 mmol/L      ANION GAP 9 mmol/L      BUN 17 mg/dL      Creatinine 0 59 mg/dL      Glucose 134 mg/dL      Calcium 9 1 mg/dL      eGFR 88 ml/min/1 73sq m     Narrative:       National Kidney Disease Foundation guidelines for Chronic Kidney Disease (CKD):     Stage 1 with normal or high GFR (GFR > 90 mL/min/1 73 square meters)    Stage 2 Mild CKD (GFR = 60-89 mL/min/1 73 square meters)    Stage 3A Moderate CKD (GFR = 45-59 mL/min/1 73 square meters)    Stage 3B Moderate CKD (GFR = 30-44 mL/min/1 73 square meters)    Stage 4 Severe CKD (GFR = 15-29 mL/min/1 73 square meters)    Stage 5 End Stage CKD (GFR <15 mL/min/1 73 square meters)  Note: GFR calculation is accurate only with a steady state creatinine                 CT head without contrast   Final Result by Pablo Hoang MD (02/20 1581)      No acute intracranial abnormality  Microangiopathic changes  Workstation performed: [id number]         CT cervical spine without contrast   Final Result by Pablo Hoang MD (02/20 3736)      No cervical spine fracture or traumatic malalignment  Workstation performed: [id number]         CT abdomen pelvis with contrast   Final Result by Pablo Hoang MD (02/20 6715)         1  No definite acute abnormality noted in the abdomen or pelvis  2   Cachexia  3   Colonic diverticulosis without evidence for diverticulitis  4   Nonobstructing 2 mm left lower pole renal collecting system calculus  5   Additional findings as noted  Workstation performed: [id number]                    Procedures  Procedures         ED Course                               MDM  Number of Diagnoses or Management Options  Abdominal pain: new and requires workup  Diarrhea: new and requires workup  Fall, initial encounter: new and requires workup  Leukocytosis: new and requires workup  Diagnosis management comments: ddx includes but is not limited to head injury, neck injury, intraabdominal injury, lumbar compression fracture, ICH, SAH, SDH, concussion, plan ct head/cspine/abd/pelvis for evaluation of injuries       Lab results reviewed - CBC remarkable for significantly elevated WBC of 31 5   hgb 10 5, hct 33 0  MCV normal 98  BMP reviewed - K normal at 4 0,  Bun 17, creatinine 0 59 normal no renal failure  CT head images independently visualized and interpreted by me  Radiology report reviewed:  FINDINGS:    PARENCHYMA: Decreased attenuation is noted in periventricular and subcortical white matter demonstrating an appearance that is statistically most likely to represent mild microangiopathic change  No CT signs of acute infarction   No intracranial mass, mass effect or midline shift   No acute parenchymal hemorrhage    Atherosclerotic vascular calcifications in carotid and vertebral arteries are more advanced than would be expected for the patient's   age  VENTRICLES AND EXTRA-AXIAL SPACES:  Normal for the patient's age  VISUALIZED ORBITS AND PARANASAL SINUSES:  Unremarkable  CALVARIUM AND EXTRACRANIAL SOFT TISSUES:  Normal     CT cervical spine images independently visualized and interpreted by me  Radiology report reviewed:  FINDINGS:    ALIGNMENT:  There is mild left lateral neck flexion with otherwise normal alignment  VERTEBRAE:  No fracture or subluxation  DEGENERATIVE CHANGES:  Mild multilevel cervical degenerative changes are noted without critical central canal stenosis  PREVERTEBRAL AND PARASPINAL SOFT TISSUES:  Unremarkable  THORACIC INLET:  Normal     CT abdomen pelvis images independently visualized and interpreted by me  Radiology report was reviewed:  ABDOMEN    LOWER CHEST:  No clinically significant abnormality identified in the visualized lower chest     LIVER/BILIARY TREE:  The liver is mildly enlarged and demonstrates no focal abnormality  GALLBLADDER:  No calcified gallstones  No pericholecystic inflammatory change  SPLEEN:  Unremarkable  PANCREAS:  Unremarkable  ADRENAL GLANDS:  Unremarkable      KIDNEYS/URETERS: There is a 2 7 x 2 0 cm simple lower pole right renal cyst present   One or more sharply circumscribed subcentimeter renal hypodensities are noted  These lesions are too small to accurately characterize, but are statistically most likely   to represent benign cortical renal cyst(s)   According to the guidelines published in the [facility] Paper of the ACR Incidental Findings Committee (Radiology 2010), no further workup of these lesions is recommended   Nonobstructing 2 mm left lower pole   renal collecting system calculus is present  STOMACH AND BOWEL:  Moderate amount of fecal material present in the colon and rectum   Scattered colonic diverticula are present  Lunette Solange is no evidence for acute diverticulitis   No definite bowel wall thickening is identified   No intestinal   obstruction is noted  APPENDIX:  No findings to suggest appendicitis  ABDOMINOPELVIC CAVITY:  No free fluid or free air noted   There is a paucity of intra-abdominal fat in this cachectic appearing patient   No definite adenopathy noted  VESSELS:  Atherosclerotic calcification of the abdominal aorta and branch vessels which are normal in course and caliber  PELVIS    REPRODUCTIVE ORGANS:  Calcified uterine fibroid   Prominent parametrial vessels left greater than right with prominent left gonadal vein  Michelle Donte URINARY BLADDER:  Displaced to the right of midline are otherwise unremarkable  ABDOMINAL WALL/INGUINAL REGIONS:  Cachectic   No hernia  OSSEOUS STRUCTURES:  Diffuse osteopenia   Fusion of the spinous processes of L3-L4-L5 noted   Approximately 30% loss of height of the superior endplate of L4 likely chronic   No lucent or sclerotic lesions are noted  I reviewed all CT scan results and lab results with the patient, patient's  and patient's daughter at bedside  Discussed all CT scan abnormalities  Discussed elevated white blood cell count  Patient has no other signs of infection  No fevers  No signs of colitis or other source of intra-abdominal infection    I did discuss the elevated white blood cell count and its implications with the patient and daughter at bedside  Offered admission for further evaluation but they declined  [place] [facility] [place] prefer to follow up with her established primary care physician as an outpatient  The daughter and the patient do offer the patient has had some diarrhea recently  She reports she has not had more than 3 episodes of diarrhea per day  I discussed with him the possibility of C diff which might explain the elevated white blood cell count  She is unable to provide a stool sample here in the emergency department  Will given outpatient prescription for stool sample for C diff to be completed as an outpatient  Discussed follow up with primary care physician for recheck in 2-3 days  Extensively discussed reasons to return to ED including any new or developing signs of infection  Reviewed reasons to return to ed  Patient verbalized understanding of diagnosis and agreement with discharge plan of care as well as understanding of reasons to return to ed  Amount and/or Complexity of Data Reviewed  Clinical lab tests: ordered and reviewed  Tests in the radiology section of CPT®: ordered and reviewed  Discussion of test results with the performing providers: yes  Obtain history from someone other than the patient: yes (EMS)  Review and summarize past medical records: yes  Independent visualization of images, tracings, or specimens: yes    Patient Progress  Patient progress: stable        Disposition  Final diagnoses:   Fall, initial encounter   Abdominal pain   Leukocytosis   Diarrhea     Time reflects when diagnosis was documented in both MDM as applicable and the Disposition within this note     Time User Action Codes Description Comment    2/20/2020  5:08 PM Manueltie Fab Add [W47  VYWF] Fall, initial encounter     2/20/2020  5:08 PM Manueltie Fab Add [R10 9] Abdominal pain     2/20/2020 5:09 PM Guillermina Lyman Add [V75 [id number]] Leukocytosis     2/20/2020  5:09 PM Guillermina Lyman Add [R19 7] Diarrhea       ED Disposition     ED Disposition Condition Date/Time Comment    Discharge Stable Thu Feb 20, 2020  5:08 PM Andrew Perez discharge to home/self care  Follow-up Information     Follow up With Specialties Details Why Contact Info Additional [id number] [facility],  Internal Medicine Call in 1 day for further evaluation of symptoms [address] [facility] [address] Emergency Department Emergency Medicine Go to  If symptoms worsen [address] [facility] [address]  [address] [place] [address] ED, [address], [place], [address] [place], MD Neurology Go to  for further evaluation of symptoms 3 Parkinson's [address] [facility]  [phone]             Patient's Medications   Discharge Prescriptions    No medications on file     Outpatient Discharge Orders   Clostridium difficile toxin by PCR with EIA   Standing Status: Future Standing Exp   Date: 02/20/21       PDMP Review       Value Time User    PDMP Reviewed  Yes 1/7/2020  8:44 AM April Cardenas DO          ED Provider  Electronically Signed by           Kath Spicer PA-C  02/20/20 1735

## 2020-02-20 NOTE — ED NOTES
Pt resting comfortably, denies need for further assistance  Pt family reports that she missed her 1200 parkinson's med dose, next dose would be 1700  Pt asks for ice water, kindly deferred for time being to await official CT results       Gloria Mack RN  02/20/20 4717

## 2020-02-24 ENCOUNTER — TELEPHONE (OUTPATIENT)
Dept: SPEECH THERAPY | Facility: CLINIC | Age: 80
End: 2020-02-24

## 2020-02-24 NOTE — TELEPHONE ENCOUNTER
Patient did not show for her 8:00am outpatient skilled Speech Therapy appointment  Clinician attempted to contact patient via telephone  Clinician left a message on patient's voicemail requesting a call back to reschedule today's appointment

## 2020-02-26 ENCOUNTER — OFFICE VISIT (OUTPATIENT)
Dept: NEUROLOGY | Facility: CLINIC | Age: 80
End: 2020-02-26
Payer: MEDICARE

## 2020-02-26 VITALS — SYSTOLIC BLOOD PRESSURE: 110 MMHG | HEART RATE: 71 BPM | DIASTOLIC BLOOD PRESSURE: 60 MMHG

## 2020-02-26 DIAGNOSIS — G20 PARKINSON DISEASE (HCC): Primary | Chronic | ICD-10-CM

## 2020-02-26 DIAGNOSIS — D72.829 LEUKOCYTOSIS, UNSPECIFIED TYPE: ICD-10-CM

## 2020-02-26 PROCEDURE — 1160F RVW MEDS BY RX/DR IN RCRD: CPT | Performed by: PSYCHIATRY & NEUROLOGY

## 2020-02-26 PROCEDURE — 99214 OFFICE O/P EST MOD 30 MIN: CPT | Performed by: PSYCHIATRY & NEUROLOGY

## 2020-02-26 PROCEDURE — 1036F TOBACCO NON-USER: CPT | Performed by: PSYCHIATRY & NEUROLOGY

## 2020-02-26 NOTE — PROGRESS NOTES
Charles Ceja is a 67 y o  female  Chief Complaint   Patient presents with    Parkinson's Disease       Assessment:  1  Parkinson disease ([address] )    2  Leukocytosis, unspecified type          Discussion:  Patient's MRI scan of the brain results were reviewed with the family and the , she had maxillary sinusitis for  which she was advised to follow up with her family physician, also recently she went to the ER after a fall and has diarrhea and increased WBC count and there was a worry about if she has C diff, I explained to the patient and the  that she should have state back in the hospital for them to workup, she did not want to do that, I've advised her to follow up with her family physician a referral was made, she is having either tardive dyskinesia or wearing off phenomenon, she is taking the Sinemet only 4 times a day insert of 5 times a day, I've advised him to take immediate release 4 times a day and the extended release at nighttime and see how she does, she otherwise she may need amantadine in the future, also we're going to have her see our Parkinson's specialist, she was advised to take fall safety and aspiration precautions, also was advised to follow up with her family physician regarding her history of depression, to be under constant supervision, we were not evaluated her for the memory, she may need that in the future, I have still not received the records from her prior neurologist, to go to the hospital if has any worsening symptoms and call me otherwise to see me back in 3 months and follow up with other physicians   I Personally spoke to [facility] nurse at [address] and told him that patient needs to follow up with the family physician regarding the increased WBC count and depression and also regarding her Sinemet that she should be on 4 times a day immediate release and extended release at nighttime and call us if has any issues and to take fall and safety precautions  Subjective:    HPI   Patient is here in follow-up for history of Parkinson's disease for the last several years at least 10 years, she is accompanied with her  and since her last visit she still continues to have the tremor that she describes mostly at rest, she was taking Sinemet 3 times a day and was advised to take it 4 times a day in addition to the extended release, but she is only taking it 4 times a day and not taking the extended release at nighttime, she is experiencing either wearing off phenomenon or tardive dyskinesia, but she says that she is having tremor especially after 4 hours and at nighttime, she denies any restless leg symptoms, she is mostly wheelchair bound and needs help to go to the bathroom, she is living in an assisted living facility [facility] and needs help with her ADLs, family is not keen to have any imaging studies of her spine, she denies any swallowing difficulty, no hallucination, no vision difficulty, she speaks in low volume, no other complaints      Vitals:    02/26/20 1530   BP: 110/60   BP Location: Left arm   Patient Position: Sitting   Cuff Size: Adult   Pulse: 71       Current Medications    Current Outpatient Medications:     acetaminophen (TYLENOL) 325 mg tablet, Take 1 tablet (325 mg total) by mouth every 6 (six) hours as needed for mild pain, Disp: 90 tablet, Rfl: 5    busPIRone (BUSPAR) 5 mg tablet, Take 1 tablet (5 mg total) by mouth 3 (three) times a day, Disp: 90 tablet, Rfl: 5    carbidopa-levodopa (SINEMET CR)  mg TBCR per ER tablet, Take 1 tablet by mouth daily at bedtime, Disp: 30 tablet, Rfl: 5    carbidopa-levodopa (SINEMET)  mg per tablet, Take 1 tablet by mouth 4 (four) times a day, Disp: 120 tablet, Rfl: 3    LORazepam (ATIVAN) 0 5 mg tablet, Take 1 tablet (0 5 mg total) by mouth daily at bedtime, Disp: 30 tablet, Rfl: 3    melatonin 3 mg, Take 2 tablets (6 mg total) by mouth daily at bedtime, Disp: 60 tablet, Rfl: 5    propranolol (INDERAL) 20 mg tablet, Take 1 tablet (20 mg total) by mouth 2 (two) times a day (Patient taking differently: Take 20 mg by mouth 3 (three) times a day ), Disp: 60 tablet, Rfl: 5    sertraline (ZOLOFT) 25 mg tablet, Take 1 tablet (25 mg total) by mouth daily, Disp: 30 tablet, Rfl: 5    thiamine (VITAMIN B1) 100 mg tablet, Take 1 tablet (100 mg total) by mouth daily, Disp: 30 tablet, Rfl: 5    zinc sulfate (ZINCATE) 220 mg capsule, Take 1 capsule (220 mg total) by mouth daily, Disp: 30 capsule, Rfl: 5      Allergies  Penicillins    Past Medical History  Past Medical History:   Diagnosis Date    Anxiety     Depressed     Disease of thyroid gland     Gait disturbance     Parkinson disease ([address] )          Past Surgical History:  Past Surgical History:   Procedure Laterality Date    SPINAL FUSION  1960         Family History:  Family History   Problem Relation Age of Onset    Parkinsonism Family        Social History:   reports that she has never smoked  She has never used smokeless tobacco  She reports that she drank alcohol  She reports that she has current or past drug history  I have reviewed the past medical history, surgical history, social and family history, current medications, allergies vitals, review of systems, and updated this information as appropriate today  Objective:    Physical Exam    Neurological Exam    GENERAL:  Cooperative in no acute distress  Well-developed and well-nourished    HEAD and NECK   Head is atraumatic normocephalic with no lesions or masses  Neck is supple with full range of motion    CARDIOVASCULAR  Carotid Arteries-no carotid bruits  NEUROLOGIC:  Mental Status-the patient is awake alert and oriented without aphasia or apraxia  Cranial Nerves: Visual fields are full to confrontation    Extraocular movements are full without nystagmus  Pupils are 2-1/2 mm and reactive  Face is symmetrical to light touch   Movements of facial expression move symmetrically  Hearing is normal to finger rub bilaterally  Soft palate lifts symmetrically  Shoulder shrug is symmetrical  Tongue is midline without atrophy  Motor: No drift is noted on arm extension  Strength is full in the upper and lower extremities with normal bulk and cogwheeling rigidity left hand worse than the right hand, mild resting tremor  Sensory: Intact to temperature and vibratory sensation in the upper and lower extremities bilaterally  Cortical function is intact  Coordination: Finger to nose testing is performed accurately  Patient is on a wheelchair  Reflexes:  2+ and symmetrical          ROS:  Review of Systems   Constitutional: Negative  Negative for appetite change and fever  HENT: Negative  Negative for hearing loss, tinnitus, trouble swallowing and voice change  Eyes: Negative  Negative for photophobia and pain  Respiratory: Negative  Negative for shortness of breath  Cardiovascular: Negative  Negative for palpitations  Gastrointestinal: Negative  Negative for nausea and vomiting  Endocrine: Negative  Negative for cold intolerance and heat intolerance  Genitourinary: Negative  Negative for dysuria, frequency and urgency  Musculoskeletal: Positive for gait problem  Negative for myalgias and neck pain  Patient states that her balance is off  Skin: Negative  Negative for rash  Neurological: Positive for tremors  Negative for dizziness, seizures, syncope, facial asymmetry, speech difficulty, weakness, light-headedness, numbness and headaches  Patient states that she has bilateral tremors in hand  Patient also stated that she has bilateral leg pain and jerking  Hematological: Negative  Does not bruise/bleed easily  Psychiatric/Behavioral: Negative  Negative for confusion, hallucinations and sleep disturbance

## 2020-02-28 ENCOUNTER — TELEPHONE (OUTPATIENT)
Dept: NEUROLOGY | Facility: CLINIC | Age: 80
End: 2020-02-28

## 2020-03-04 ENCOUNTER — TELEPHONE (OUTPATIENT)
Dept: NEUROLOGY | Facility: CLINIC | Age: 80
End: 2020-03-04

## 2020-03-26 ENCOUNTER — TELEPHONE (OUTPATIENT)
Dept: NEUROLOGY | Facility: CLINIC | Age: 80
End: 2020-03-26

## 2020-03-26 NOTE — TELEPHONE ENCOUNTER
LMOM regarding rescheduling appt from 03/31/20 to 03/27/20 for 10:30 am  Pt to call office to confirm accepting/declining appt  Slot on hold for pt

## 2020-03-30 ENCOUNTER — TELEPHONE (OUTPATIENT)
Dept: NEUROLOGY | Facility: CLINIC | Age: 80
End: 2020-03-30

## 2020-03-30 DIAGNOSIS — G20 PARKINSON DISEASE (HCC): Chronic | ICD-10-CM

## 2020-03-30 NOTE — TELEPHONE ENCOUNTER
Pt's daughter Rachael Garcia call stated that she will not be able to be there for there virtual visit and she would like to know if provider can do a face time call they have an iphone  Daughter requested a call #[phone] she wants to make sure It could be done

## 2020-03-30 NOTE — TELEPHONE ENCOUNTER
What's the question? Can we do a multi-person [employer] call? [employer] is not a secure line so I would rather not  We can use a service called Doxy  me which looks like it does work with 3-way calls  Prior to the visit they would each go to the website "doxy  me/tan" check in and I could see them both at the same time   Otherwise we can reschedule for a future date

## 2020-03-30 NOTE — TELEPHONE ENCOUNTER
KHALIDA-Spoke with daughter who gave me the dad cell phone number [phone] just for the Appt on 3/31/2020   Any other concern call Bharath Kim

## 2020-03-30 NOTE — TELEPHONE ENCOUNTER
Due to patient and patient's  being on locked down in a nursing facility daughter is not able to help with virtual call  I let daughter, Alicia Ley, know that you are able to call patient on the phone  Preferred contact is patient's , Aure Stein, and contact number is [phone]  Annona Fee is having difficulty with speech so Aure Stein will be answering phone call for appt

## 2020-03-31 ENCOUNTER — OFFICE VISIT (OUTPATIENT)
Dept: NEUROLOGY | Facility: CLINIC | Age: 80
End: 2020-03-31
Payer: MEDICARE

## 2020-03-31 ENCOUNTER — TELEPHONE (OUTPATIENT)
Dept: INTERNAL MEDICINE CLINIC | Facility: CLINIC | Age: 80
End: 2020-03-31

## 2020-03-31 DIAGNOSIS — Z99.3 WHEELCHAIR BOUND: Chronic | ICD-10-CM

## 2020-03-31 DIAGNOSIS — G20 PARKINSON DISEASE (HCC): Primary | Chronic | ICD-10-CM

## 2020-03-31 DIAGNOSIS — F33.2 SEVERE EPISODE OF RECURRENT MAJOR DEPRESSIVE DISORDER, WITHOUT PSYCHOTIC FEATURES (HCC): Chronic | ICD-10-CM

## 2020-03-31 PROCEDURE — 99215 OFFICE O/P EST HI 40 MIN: CPT | Performed by: PSYCHIATRY & NEUROLOGY

## 2020-03-31 NOTE — PROGRESS NOTES
Virtual Brief Visit    Problem List Items Addressed This Visit        Nervous and Auditory    Parkinson disease ([address] ) (Chronic)                Reason for visit is Parkinson's disease     Encounter provider Keyona Junior MD    Provider located at   [address] [place] [id number]      Recent Visits  Date Type Provider Dept   03/30/20 Telephone Keyona Junior MD [facility]   03/26/20 Telephone Domi Navarro [facility] [address] [facility] recent visits within past 7 days and meeting all other requirements     Today's Visits  Date Type Provider Dept   03/31/20 Office Visit Keyona Junior MD [facility]   Showing today's visits and meeting all other requirements     Future Appointments  No visits were found meeting these conditions  Showing future appointments within next 150 days and meeting all other requirements        After connecting through telephone, the patient was identified by name and date of birth  Alto Her was informed that this is a telemedicine visit and that the visit is being conducted through telephone  My office door was closed  No one else was in the room  She acknowledged consent and understanding of privacy and security of the telephone platform  The patient has agreed to participate and understands they can discontinue the visit at any time  Patient is aware this is a billable service  Patient agrees to participate in a virtual visit via telephone instead of presenting to the office in order to address immediate medical needs in the setting of the current COVID-19 pandemic  Patient is aware this is a billable service  I informed the patient that I have reviewed the records in Epic and presented the opportunity to ask any questions regarding the visit today   We discussed the limitations of the telemedicine platform and that in the absence of a face-to-face physical evaluation any diagnosis or medical advice she receives is both limited and provisional in terms of accuracy and completeness  she expressed full understanding and accepts these terms  The patient initiated communication and agreed to participate  Subjective  I had the pleasure of seeing your patient, Adrian Hagan in the 2020 [facility]  Adrian Hagan is a 67 y o  woman who presents for subspecialty opinion regarding Parkinson's disease symptom onset around 2012 with bilateral upper extremity tremor, now complicated by motor fluctuations with wearing off and dyskinesias  The patient is a resident at [address] [place] [address]  History was gathered largely from the patient's   To review briefly, patient's symptoms emerged around 2012 with bilateral hand tremor and have been getting progressively worse since  She originally saw a neurologist in [place] and was started on Sinemet about 6 years ago  Medication was very effective when 1st started  She has seen a few neurologists since that time most recently Dr Leonidas Siu  The patient also has a history severe depression and it is unclear who is managing her psychiatric medications  She has had no other medication trials for Parkinson's disease other than Sinemet  She denies any neuroleptics exposures  Family history no worthy for father with tremors which was likely Parkinson's disease  The patient's most bothersome symptom is her tremor  Current medications and timing:  Sinemet 25/100; 1 tab; 5 times per day @ 6 11 3 6   Sinemet CR 25/100; 1 tab @ 9:30pm  buspar 5mg TID  zoloft 25mg daily     Regarding motor symptoms:   Tremor: bl hands  Can be there throughout the day on/off  Slowness: significant   Stiffness: yes  Dystonia: no   Changes in gait: mostly wheelchair bound now starting August 2019  Falls: no   Freezing: no   Trouble with swallowing: yes   Getting significant - on a modified diet now     Regarding non-motor symptoms:   Anosmia: no  Constipation: from time to time   Lightheadedness: yes, mostly in the morning  Changes in Mood: significant depression still  Trouble with sleep: issues with congestion  REM behavior Disorder: no   Memory trouble: no significant issues   Hallucinations: no     Regarding medication complication:   Wearing off: lasts about 3-4 hours   Dyskinesias: trunk and legs  Worse around 3:30-4        Past Medical History:   Diagnosis Date    Anxiety     Depressed     Disease of thyroid gland     Gait disturbance     Parkinson disease ([address] )        Past Surgical History:   Procedure Laterality Date    SPINAL FUSION  1960       Current Outpatient Medications   Medication Sig Dispense Refill    acetaminophen (TYLENOL) 325 mg tablet Take 1 tablet (325 mg total) by mouth every 6 (six) hours as needed for mild pain 90 tablet 5    busPIRone (BUSPAR) 5 mg tablet Take 1 tablet (5 mg total) by mouth 3 (three) times a day 90 tablet 5    carbidopa-levodopa (SINEMET CR)  mg TBCR per ER tablet Take 1 tablet by mouth daily at bedtime 30 tablet 5    carbidopa-levodopa (SINEMET)  mg per tablet Take 1 tablet by mouth 4 (four) times a day 120 tablet 5    LORazepam (ATIVAN) 0 5 mg tablet Take 1 tablet (0 5 mg total) by mouth daily at bedtime 30 tablet 3    melatonin 3 mg Take 2 tablets (6 mg total) by mouth daily at bedtime 60 tablet 5    propranolol (INDERAL) 20 mg tablet Take 1 tablet (20 mg total) by mouth 2 (two) times a day (Patient taking differently: Take 20 mg by mouth 3 (three) times a day ) 60 tablet 5    sertraline (ZOLOFT) 25 mg tablet Take 1 tablet (25 mg total) by mouth daily 30 tablet 5    thiamine (VITAMIN B1) 100 mg tablet Take 1 tablet (100 mg total) by mouth daily 30 tablet 5    zinc sulfate (ZINCATE) 220 mg capsule Take 1 capsule (220 mg total) by mouth daily 30 capsule 5     No current facility-administered medications for this visit           Allergies Allergen Reactions    Penicillins Itching       Review of Systems  Ten point ROS was reviewed and updated, negative unless stated above  Assessment and recommendations  I spent 45 minutes with the patient during this visit in which greater than 50% of this time was spent in counseling/coordination of care regarding Diagnostic results, Prognosis, Risks and benefits of tx options, Intructions for management, Patient and family education and Impressions  50-year-old woman presents for subspecialty opinion regarding Parkinson's disease complicated by motor fluctuations including wearing and dyskinesias  Patient is largely wheelchair-bound  Patient's chart was reviewed as were her prior physical exams  Obviously it is impossible for me to confirm her diagnosis of Parkinson's disease as our interaction was done over the phone  Assuming her diagnosis is correct made the following recommendations  - Increase sinemet IR to 1 5 tablets for her first 2 doses (at 6 and 11am) to combat her tremor with the hopes of minimally worsening her afternoon dyskinesias  - We discussed the risks and benefits of starting amantadine to suppress her dyskinesias and treat tremor  Depending on how she reacts to the above change could consider this  - We could also consider entacapone for the morning doses or Zonisamide QHS for tremor which should not worsen dyskinesia  She would be a good candidate for [facility]  It is unclear who is managing her psychiatric medications but I would recommend increasing her Zoloft  We could take over prescribing this if needed  She is on a low dose and reportedly very depressed  Follow up in two weeks to see how this small change is effecting her

## 2020-03-31 NOTE — TELEPHONE ENCOUNTER
Dr Lupillo Agarwal just saw her today and changed the dosing      Prescription that should be followed is how Dr Lupillo Agarwal prescribed it    Can reference his office note from today

## 2020-03-31 NOTE — TELEPHONE ENCOUNTER
[facility] FOR CARBIDOPA-LEVODOPA  MG TAB  You are rx'ing this and so is [employer]  There is a conflict on how she is to be taking this    Please call Daren Mae from [facility] at [phone]

## 2020-04-02 ENCOUNTER — TELEPHONE (OUTPATIENT)
Dept: INTERNAL MEDICINE CLINIC | Facility: CLINIC | Age: 80
End: 2020-04-02

## 2020-04-09 ENCOUNTER — TELEPHONE (OUTPATIENT)
Dept: INTERNAL MEDICINE CLINIC | Facility: CLINIC | Age: 80
End: 2020-04-09

## 2020-04-10 ENCOUNTER — TELEMEDICINE (OUTPATIENT)
Dept: INTERNAL MEDICINE CLINIC | Facility: CLINIC | Age: 80
End: 2020-04-10
Payer: MEDICARE

## 2020-04-10 ENCOUNTER — TELEPHONE (OUTPATIENT)
Dept: NEUROLOGY | Facility: CLINIC | Age: 80
End: 2020-04-10

## 2020-04-10 DIAGNOSIS — F33.2 SEVERE EPISODE OF RECURRENT MAJOR DEPRESSIVE DISORDER, WITHOUT PSYCHOTIC FEATURES (HCC): Chronic | ICD-10-CM

## 2020-04-10 DIAGNOSIS — R09.82 POST-NASAL DRIP: ICD-10-CM

## 2020-04-10 DIAGNOSIS — G20 PARKINSON DISEASE (HCC): Primary | Chronic | ICD-10-CM

## 2020-04-10 PROCEDURE — 99213 OFFICE O/P EST LOW 20 MIN: CPT | Performed by: INTERNAL MEDICINE

## 2020-04-10 RX ORDER — FLUTICASONE PROPIONATE 50 MCG
2 SPRAY, SUSPENSION (ML) NASAL DAILY
Qty: 16 G | Refills: 5 | Status: SHIPPED | OUTPATIENT
Start: 2020-04-10

## 2020-04-22 ENCOUNTER — TELEMEDICINE (OUTPATIENT)
Dept: NEUROLOGY | Facility: CLINIC | Age: 80
End: 2020-04-22
Payer: MEDICARE

## 2020-04-22 DIAGNOSIS — F33.2 SEVERE EPISODE OF RECURRENT MAJOR DEPRESSIVE DISORDER, WITHOUT PSYCHOTIC FEATURES (HCC): Chronic | ICD-10-CM

## 2020-04-22 DIAGNOSIS — G20 PARKINSON DISEASE (HCC): Primary | Chronic | ICD-10-CM

## 2020-04-22 PROCEDURE — 99442 PR PHYS/QHP TELEPHONE EVALUATION 11-20 MIN: CPT | Performed by: PHYSICIAN ASSISTANT

## 2020-04-22 RX ORDER — LORAZEPAM 0.5 MG/1
0.5 TABLET ORAL
COMMUNITY
End: 2022-01-01

## 2020-04-22 RX ORDER — AMANTADINE HYDROCHLORIDE 100 MG/1
CAPSULE, GELATIN COATED ORAL
Qty: 60 CAPSULE | Refills: 3 | Status: SHIPPED | OUTPATIENT
Start: 2020-04-22 | End: 2020-05-21

## 2020-05-01 ENCOUNTER — TELEPHONE (OUTPATIENT)
Dept: NEUROLOGY | Facility: CLINIC | Age: 80
End: 2020-05-01

## 2020-05-06 ENCOUNTER — TELEPHONE (OUTPATIENT)
Dept: NEUROLOGY | Facility: CLINIC | Age: 80
End: 2020-05-06

## 2020-05-21 ENCOUNTER — TELEMEDICINE (OUTPATIENT)
Dept: NEUROLOGY | Facility: CLINIC | Age: 80
End: 2020-05-21
Payer: MEDICARE

## 2020-05-21 DIAGNOSIS — F33.2 SEVERE EPISODE OF RECURRENT MAJOR DEPRESSIVE DISORDER, WITHOUT PSYCHOTIC FEATURES (HCC): Primary | Chronic | ICD-10-CM

## 2020-05-21 DIAGNOSIS — G20 PARKINSON DISEASE (HCC): Chronic | ICD-10-CM

## 2020-05-21 PROCEDURE — 99214 OFFICE O/P EST MOD 30 MIN: CPT | Performed by: PHYSICIAN ASSISTANT

## 2020-05-21 RX ORDER — CARBIDOPA AND LEVODOPA 25; 100 MG/1; MG/1
1 TABLET, EXTENDED RELEASE ORAL
Qty: 30 TABLET | Refills: 5 | Status: SHIPPED | OUTPATIENT
Start: 2020-05-21

## 2020-05-21 RX ORDER — ZONISAMIDE 25 MG/1
25 CAPSULE ORAL
Qty: 30 CAPSULE | Refills: 1 | Status: SHIPPED | OUTPATIENT
Start: 2020-05-21

## 2020-07-14 ENCOUNTER — TELEMEDICINE (OUTPATIENT)
Dept: NEUROLOGY | Facility: CLINIC | Age: 80
End: 2020-07-14
Payer: COMMERCIAL

## 2020-07-14 ENCOUNTER — TELEPHONE (OUTPATIENT)
Dept: NEUROLOGY | Facility: CLINIC | Age: 80
End: 2020-07-14

## 2020-07-14 DIAGNOSIS — G20 PARKINSON DISEASE (HCC): Primary | Chronic | ICD-10-CM

## 2020-07-14 DIAGNOSIS — F33.2 SEVERE EPISODE OF RECURRENT MAJOR DEPRESSIVE DISORDER, WITHOUT PSYCHOTIC FEATURES (HCC): Chronic | ICD-10-CM

## 2020-07-14 PROCEDURE — 99442 PR PHYS/QHP TELEPHONE EVALUATION 11-20 MIN: CPT | Performed by: PHYSICIAN ASSISTANT

## 2020-07-14 NOTE — TELEPHONE ENCOUNTER
Called pt to start the office visit note prior to the virtual phone visit  I was unable to connect the phone call so I LMOM asking the pt to please give us a call back so that we can start the office visit note

## 2020-07-14 NOTE — ASSESSMENT & PLAN NOTE
Patient continues to have progression of parkinsonian symptoms per the   She is wheelchair-bound at this time  She requires assistance for all ADLs  Her tremors remain an issue  He does notice some improvement after taking a dose of Sinemet however this does not last long and she unfortunately also does continue to have mid dose dyskinesias as well  She was unable to tolerate amantadine due to hallucinations  No clear benefit with the addition of zonisamide  She does continue to have issues with weight loss so will hold on increasing the dose any further  I did discuss the option of Charlee Spore for wearing off however at this time will hold on starting any medication  I did discuss with the  the difficulty with treating her parkinsonian symptoms without any formal in person visits  Hopefully we will be able to have an in-person visit for her next follow-up  At that time we can certainly talk about potential changes in her medication based on her exam   For now she will remain on her current dose of Sinemet  Her  does feel as though her depression is slightly better however still present  She is following with her PCP for management of this  Her Zoloft dose had been increased to 50 mg in the past and there was some discussion about increasing further to 100 mg if depression continues  It does sound as though she is having some issues with swallowing and would certainly recommend a swallow study be performed at her facility

## 2020-07-14 NOTE — PATIENT INSTRUCTIONS
Continue current dose of Sinemet  Continue current dose of zonisamide  May consider stopping this medication at follow-up visit  Encouraged patient to try and get active  She could certainly benefit from some exercises and range of motion activities that can be done in her chair  She is having some issues with dysphagia especially with pills  Will order a swallow study for evaluation  She would certainly benefit from an in-person visit given we have not been able to see the extent of her parkinsonian symptoms at this time  Will try and get a follow-up visit in 2 months in the office

## 2020-07-14 NOTE — PROGRESS NOTES
Virtual Brief Visit    Assessment/Plan:    Problem List Items Addressed This Visit        Nervous and Auditory    Parkinson disease ([facility] [address] ) - Primary (Chronic)     Patient continues to have progression of parkinsonian symptoms per the   She is wheelchair-bound at this time  She requires assistance for all ADLs  Her tremors remain an issue  He does notice some improvement after taking a dose of Sinemet however this does not last long and she unfortunately also does continue to have mid dose dyskinesias as well  She was unable to tolerate amantadine due to hallucinations  No clear benefit with the addition of zonisamide  She does continue to have issues with weight loss so will hold on increasing the dose any further  I did discuss the option of Junito Avers for wearing off however at this time will hold on starting any medication  I did discuss with the  the difficulty with treating her parkinsonian symptoms without any formal in person visits  Hopefully we will be able to have an in-person visit for her next follow-up  At that time we can certainly talk about potential changes in her medication based on her exam   For now she will remain on her current dose of Sinemet  Her  does feel as though her depression is slightly better however still present  She is following with her PCP for management of this  Her Zoloft dose had been increased to 50 mg in the past and there was some discussion about increasing further to 100 mg if depression continues  It does sound as though she is having some issues with swallowing and would certainly recommend a swallow study be performed at her facility  Relevant Orders    FL barium swallow video w speech       Other    Severe episode of recurrent major depressive disorder, without psychotic features ([facility] [address] ) (Chronic)                Reason for visit is follow up for Parkinson's disease       Chief Complaint   Patient presents with   [facility] Parkinson's Disease    Virtual Regular Visit    Virtual Brief Visit        Encounter provider Nessa Jade PA-C    Provider located at [address] [place] [id number]    Recent Visits  No visits were found meeting these conditions  Showing recent visits within past 7 days and meeting all other requirements     Today's Visits  Date Type Provider Dept   07/14/20 Telephone Addy Schmidt MA [facility]   07/14/20 [address] [place], [place] [address]PREET [facility]   Showing today's visits and meeting all other requirements     Future Appointments  Date Type Provider Dept   07/14/20 Telephone Addy Schmidt MA [facility]   Showing future appointments within next 150 days and meeting all other requirements        After connecting through telephone and patient was informed that this is not a secure, HIPAA-complaint platform  She agrees to proceed  , the patient was identified by name and date of birth  Sebastian Singh was informed that this is a telemedicine visit and that the visit is being conducted through the telephone and patient was informed that this is not a secure, HIPAA-complaint platform  She agrees to proceed     My office door was closed  No one else was in the room  She acknowledged consent and understanding of privacy and security of the platform  The patient has agreed to participate and understands she can discontinue the visit at any time  Patient is aware this is a billable service  It was my intent to perform this visit via video technology but the patient was not able to do a video connection so the visit was completed via audio telephone only  Subjective    Sebastian Singh is a 67 y o  female with Parkinson's disease symptom onset around 2012 with bilateral upper extremity tremor, now complicated by motor fluctuations with wearing off and dyskinesias    The patient is a resident at [facility]  To review briefly, patient's symptoms emerged around 2012 with bilateral hand tremor and have been getting progressively worse since  She originally saw a neurologist in [place] and was started on Sinemet about 6 years ago  Medication was very effective when 1st started  She has seen a few neurologists since that time most recently Dr Bella Martinez  The patient also has a history severe depression and it is unclear who is managing her psychiatric medications  She has had no other medication trials for Parkinson's disease other than Sinemet  She denies any neuroleptics exposures  Family history noteworthy for father with tremors which was likely Parkinson's disease  The patient's most bothersome symptom is her tremor  At her last visit she had hallucinations with amantadine and this was discontinued  She was started on a trial of zonisamide to see if perhaps this would help with symptoms without causing worsening hallucinations  She was also asked to discuss treatment of her depression with her PCP  Interval history:   No clear change since the last visit  No benefit noted with her medications  She is at an assisted living facility since 8/2019  She continues to have tremors and dyskinesias  The dyskinesias seem worse after taking a dose of Sinemet  Her voice is weak  She has difficulty swallowing pills  She is losing weight  She had PT for a while however she has not had any recent  She is now wheel chair bound  She can feed herself  She has assistance with dressing and showering  Her depression does seem better than it had been however still present       Current medications and timing:  Sinemet 25/100; 1 5 tabs at 6am, 11am, 1tab at 3pm and 6pm - improvement of tremors for a period of time after taking a dose of medication  Sinemet CR 25/100; 1 tab @ 9:30pm  Zonisamide 25mg qhs  buspar 5mg TID  zoloft 50mg daily       I personally reviewed and updated the ROS       Past Medical History:   Diagnosis Date    Anxiety     Depressed     Disease of thyroid gland     Gait disturbance     Parkinson disease ([address] )        Past Surgical History:   Procedure Laterality Date    SPINAL FUSION  1960       Current Outpatient Medications   Medication Sig Dispense Refill    acetaminophen (TYLENOL) 325 mg tablet Take 1 tablet (325 mg total) by mouth every 6 (six) hours as needed for mild pain 90 tablet 5    busPIRone (BUSPAR) 5 mg tablet Take 1 tablet (5 mg total) by mouth 3 (three) times a day 90 tablet 5    carbidopa-levodopa (SINEMET CR)  mg TBCR per ER tablet Take 1 tablet by mouth daily at bedtime 30 tablet 5    carbidopa-levodopa (SINEMET)  mg per tablet Take 1 tablet by mouth 4 (four) times a day 5 tablets/day  1 5 tab at 6a &11a; 1 tablet at 3p & 6p 450 tablet 3    fluticasone (FLONASE) 50 mcg/act nasal spray 2 sprays into each nostril daily 16 g 5    LORazepam (ATIVAN) 0 5 mg tablet Take 0 5 mg by mouth daily at bedtime      propranolol (INDERAL) 20 mg tablet Take 1 tablet (20 mg total) by mouth 2 (two) times a day 60 tablet 5    sertraline (ZOLOFT) 50 mg tablet Take 1 tablet (50 mg total) by mouth daily 30 tablet 0    thiamine (VITAMIN B1) 100 mg tablet Take 1 tablet (100 mg total) by mouth daily 30 tablet 5    zinc sulfate (ZINCATE) 220 mg capsule Take 1 capsule (220 mg total) by mouth daily 30 capsule 5    zonisamide (ZONEGRAN) 25 mg capsule Take 1 capsule (25 mg total) by mouth daily at bedtime 30 capsule 1    melatonin 3 mg Take 2 tablets (6 mg total) by mouth daily at bedtime (Patient not taking: Reported on 4/22/2020) 60 tablet 5     No current facility-administered medications for this visit  Allergies   Allergen Reactions    Penicillins Itching       Review of Systems   Constitutional: Negative  Negative for appetite change and fever  HENT: Positive for trouble swallowing (sometimes) and voice change (voice is very weak)  Negative for hearing loss and tinnitus  Eyes: Negative  Negative for photophobia and pain  Respiratory: Negative  Negative for shortness of breath  Cardiovascular: Negative  Negative for palpitations  Gastrointestinal: Negative  Negative for nausea and vomiting  Endocrine: Negative  Negative for cold intolerance  Genitourinary: Negative  Negative for dysuria, frequency and urgency  Musculoskeletal: Positive for back pain, gait problem (wheelchair bound) and neck pain  Negative for myalgias  Balance issues     Skin: Negative  Negative for rash  Neurological: Positive for tremors  Negative for dizziness, seizures, syncope, facial asymmetry, speech difficulty, weakness, light-headedness, numbness and headaches  Uncontrolled leg movement     Hematological: Negative  Does not bruise/bleed easily  Psychiatric/Behavioral: Negative  Negative for confusion, hallucinations and sleep disturbance  All other systems reviewed and are negative  There were no vitals filed for this visit  I spent 20 minutes directly with the patient during this visit    VIRTUAL VISIT DISCLAIMER    Dhiraj Monika acknowledges that she has consented to an online visit or consultation  She understands that the online visit is based solely on information provided by her, and that, in the absence of a face-to-face physical evaluation by the physician, the diagnosis she receives is both limited and provisional in terms of accuracy and completeness  This is not intended to replace a full medical face-to-face evaluation by the physician  Dhiraj Frank understands and accepts these terms

## 2020-07-14 NOTE — TELEPHONE ENCOUNTER
Called pt second attempt and LMOM stating that I am calling to go over pt meds and ROS prior to the visit today and I then asked the pt to please give us a call back to go over this info

## 2020-12-01 ENCOUNTER — TELEPHONE (OUTPATIENT)
Dept: INTERNAL MEDICINE CLINIC | Facility: CLINIC | Age: 80
End: 2020-12-01

## 2020-12-01 DIAGNOSIS — R19.7 DIARRHEA, UNSPECIFIED TYPE: Primary | ICD-10-CM

## 2020-12-01 RX ORDER — LOPERAMIDE HYDROCHLORIDE 2 MG/1
2 CAPSULE ORAL 3 TIMES DAILY PRN
Qty: 90 CAPSULE | Refills: 3 | Status: SHIPPED | OUTPATIENT
Start: 2020-12-01 | End: 2020-12-01

## 2020-12-01 RX ORDER — LOPERAMIDE HYDROCHLORIDE 2 MG/1
2 CAPSULE ORAL 3 TIMES DAILY PRN
Qty: 90 CAPSULE | Refills: 3 | Status: SHIPPED | OUTPATIENT
Start: 2020-12-01

## 2021-01-01 ENCOUNTER — TELEMEDICINE (OUTPATIENT)
Dept: NEUROLOGY | Facility: CLINIC | Age: 81
End: 2021-01-01
Payer: MEDICARE

## 2021-01-01 ENCOUNTER — TELEPHONE (OUTPATIENT)
Dept: NEUROLOGY | Facility: CLINIC | Age: 81
End: 2021-01-01

## 2021-01-01 ENCOUNTER — TELEPHONE (OUTPATIENT)
Dept: INTERNAL MEDICINE CLINIC | Facility: CLINIC | Age: 81
End: 2021-01-01

## 2021-01-01 ENCOUNTER — APPOINTMENT (OUTPATIENT)
Dept: LAB | Facility: CLINIC | Age: 81
End: 2021-01-01
Payer: MEDICARE

## 2021-01-01 ENCOUNTER — OFFICE VISIT (OUTPATIENT)
Dept: INTERNAL MEDICINE CLINIC | Facility: CLINIC | Age: 81
End: 2021-01-01
Payer: MEDICARE

## 2021-01-01 VITALS
HEART RATE: 61 BPM | SYSTOLIC BLOOD PRESSURE: 92 MMHG | OXYGEN SATURATION: 97 % | RESPIRATION RATE: 16 BRPM | DIASTOLIC BLOOD PRESSURE: 64 MMHG | TEMPERATURE: 97.9 F

## 2021-01-01 DIAGNOSIS — R25.2 MUSCLE CRAMPING: ICD-10-CM

## 2021-01-01 DIAGNOSIS — Z23 ENCOUNTER FOR IMMUNIZATION: Primary | ICD-10-CM

## 2021-01-01 DIAGNOSIS — H61.21 IMPACTED CERUMEN OF RIGHT EAR: ICD-10-CM

## 2021-01-01 DIAGNOSIS — G20 PARKINSON DISEASE (HCC): Primary | ICD-10-CM

## 2021-01-01 DIAGNOSIS — Z99.3 WHEELCHAIR BOUND: Chronic | ICD-10-CM

## 2021-01-01 DIAGNOSIS — G20 PARKINSON DISEASE (HCC): ICD-10-CM

## 2021-01-01 DIAGNOSIS — Z00.00 HEALTHCARE MAINTENANCE: ICD-10-CM

## 2021-01-01 DIAGNOSIS — F33.2 SEVERE EPISODE OF RECURRENT MAJOR DEPRESSIVE DISORDER, WITHOUT PSYCHOTIC FEATURES (HCC): ICD-10-CM

## 2021-01-01 DIAGNOSIS — G20 PARKINSON DISEASE (HCC): Primary | Chronic | ICD-10-CM

## 2021-01-01 LAB
ALBUMIN SERPL BCP-MCNC: 3.8 G/DL (ref 3.5–5)
ALP SERPL-CCNC: 118 U/L (ref 46–116)
ALT SERPL W P-5'-P-CCNC: 9 U/L (ref 12–78)
ANION GAP SERPL CALCULATED.3IONS-SCNC: 3 MMOL/L (ref 4–13)
AST SERPL W P-5'-P-CCNC: 14 U/L (ref 5–45)
BASOPHILS # BLD AUTO: 0.04 THOUSANDS/ΜL (ref 0–0.1)
BASOPHILS NFR BLD AUTO: 1 % (ref 0–1)
BILIRUB SERPL-MCNC: 0.49 MG/DL (ref 0.2–1)
BUN SERPL-MCNC: 16 MG/DL (ref 5–25)
CALCIUM SERPL-MCNC: 9.5 MG/DL (ref 8.3–10.1)
CHLORIDE SERPL-SCNC: 103 MMOL/L (ref 100–108)
CO2 SERPL-SCNC: 30 MMOL/L (ref 21–32)
CREAT SERPL-MCNC: 0.58 MG/DL (ref 0.6–1.3)
EOSINOPHIL # BLD AUTO: 0.05 THOUSAND/ΜL (ref 0–0.61)
EOSINOPHIL NFR BLD AUTO: 1 % (ref 0–6)
ERYTHROCYTE [DISTWIDTH] IN BLOOD BY AUTOMATED COUNT: 12.7 % (ref 11.6–15.1)
GFR SERPL CREATININE-BSD FRML MDRD: 87 ML/MIN/1.73SQ M
GLUCOSE P FAST SERPL-MCNC: 101 MG/DL (ref 65–99)
HCT VFR BLD AUTO: 37.9 % (ref 34.8–46.1)
HGB BLD-MCNC: 12 G/DL (ref 11.5–15.4)
IMM GRANULOCYTES # BLD AUTO: 0.01 THOUSAND/UL (ref 0–0.2)
IMM GRANULOCYTES NFR BLD AUTO: 0 % (ref 0–2)
LYMPHOCYTES # BLD AUTO: 1.12 THOUSANDS/ΜL (ref 0.6–4.47)
LYMPHOCYTES NFR BLD AUTO: 18 % (ref 14–44)
MAGNESIUM SERPL-MCNC: 2.4 MG/DL (ref 1.6–2.6)
MCH RBC QN AUTO: 30.3 PG (ref 26.8–34.3)
MCHC RBC AUTO-ENTMCNC: 31.7 G/DL (ref 31.4–37.4)
MCV RBC AUTO: 96 FL (ref 82–98)
MONOCYTES # BLD AUTO: 0.74 THOUSAND/ΜL (ref 0.17–1.22)
MONOCYTES NFR BLD AUTO: 12 % (ref 4–12)
NEUTROPHILS # BLD AUTO: 4.28 THOUSANDS/ΜL (ref 1.85–7.62)
NEUTS SEG NFR BLD AUTO: 68 % (ref 43–75)
NRBC BLD AUTO-RTO: 0 /100 WBCS
PLATELET # BLD AUTO: 323 THOUSANDS/UL (ref 149–390)
PMV BLD AUTO: 9.6 FL (ref 8.9–12.7)
POTASSIUM SERPL-SCNC: 4.2 MMOL/L (ref 3.5–5.3)
PROT SERPL-MCNC: 8 G/DL (ref 6.4–8.2)
RBC # BLD AUTO: 3.96 MILLION/UL (ref 3.81–5.12)
SODIUM SERPL-SCNC: 136 MMOL/L (ref 136–145)
WBC # BLD AUTO: 6.24 THOUSAND/UL (ref 4.31–10.16)

## 2021-01-01 PROCEDURE — 99214 OFFICE O/P EST MOD 30 MIN: CPT | Performed by: NURSE PRACTITIONER

## 2021-01-01 PROCEDURE — 99442 PR PHYS/QHP TELEPHONE EVALUATION 11-20 MIN: CPT | Performed by: PHYSICIAN ASSISTANT

## 2021-01-01 PROCEDURE — G0008 ADMIN INFLUENZA VIRUS VAC: HCPCS | Performed by: NURSE PRACTITIONER

## 2021-01-01 PROCEDURE — G0438 PPPS, INITIAL VISIT: HCPCS | Performed by: NURSE PRACTITIONER

## 2021-01-01 PROCEDURE — 90662 IIV NO PRSV INCREASED AG IM: CPT | Performed by: NURSE PRACTITIONER

## 2021-01-01 PROCEDURE — 1123F ACP DISCUSS/DSCN MKR DOCD: CPT | Performed by: NURSE PRACTITIONER

## 2021-01-01 PROCEDURE — 85025 COMPLETE CBC W/AUTO DIFF WBC: CPT

## 2021-01-01 PROCEDURE — 80053 COMPREHEN METABOLIC PANEL: CPT

## 2021-01-01 PROCEDURE — 1160F RVW MEDS BY RX/DR IN RCRD: CPT | Performed by: NURSE PRACTITIONER

## 2021-01-01 PROCEDURE — 36415 COLL VENOUS BLD VENIPUNCTURE: CPT

## 2021-01-01 PROCEDURE — 83735 ASSAY OF MAGNESIUM: CPT

## 2021-01-01 RX ORDER — SERTRALINE HYDROCHLORIDE 100 MG/1
100 TABLET, FILM COATED ORAL DAILY
Qty: 90 TABLET | Refills: 1 | Status: SHIPPED | OUTPATIENT
Start: 2021-01-01

## 2021-07-21 NOTE — LETTER
July 21, 2021     Adrian Dutta  [address]     Dear Ms Bogdan Lovewright,    Appointment Missed: 7/21/2021      Appointment Scheduled with: Ria Mccollum MD    We understand that many situations arise that occasionally prevents patients from keeping scheduled appointments  It is the policy of [facility] that patients notify us 24 hours in advance if unable to keep a scheduled appointment  Missed appointments jeopardize strong physician-patient relationships  The appointment you missed could have easily been made available to another patient if you had contacted us to cancel  We like to accommodate all of our patients, but when patients miss an appointment it prevents us from being able to help everyone  In the future, we request at least a 24 hour notice of cancellation so we can make your appointment available to someone else in need         Sincerely,    The Physicians and Staff of [facility]

## 2021-08-20 NOTE — TELEPHONE ENCOUNTER
Patient's daughter Jorge Wilder stated she was told the soonest available appt for patient is in November, however she stated the patient really needs to be seen sooner  She stated the patient's PD sxs are just worsening and she doesn't know what else they can do for the patient  The patient seems to think that she can just take the meds to get better  Patient is scheduled on 11/16  Patient is already on the wait list     Daughter is requesting to speak to Dr Rojas Young: [phone]    Mariusz Tello can you please look into sooner appt for patient  Thank you

## 2021-08-20 NOTE — TELEPHONE ENCOUNTER
Please bring the patient to see me in the next 2 weeks, did leave a message for the daughter     Thank you

## 2021-08-25 NOTE — TELEPHONE ENCOUNTER
Called and left message on daughter Riya's v/m  There is a sooner appt for 8/26/2021 at 4PM   Left message for a call back to confirm if the pt will be taking the sooner appt    Hold was put on schedule

## 2021-08-27 NOTE — TELEPHONE ENCOUNTER
Left message with Hanny Dalton, patient's daughter to schedule sooner appt with Dr Norman Erickson per his request - availability is today 8/27 @ 4p - please assist if still available

## 2021-10-29 NOTE — TELEPHONE ENCOUNTER
[facility]           [phone]    They rec'ed an order from Eliel Quintana for palliative care and all she needs are 2 most recent office visit notes    faxed to her @  [phone]

## 2021-12-29 NOTE — TELEPHONE ENCOUNTER
DONNA   THERAPIST  CALLED   ASKING  FOR  AN ORDER  FOR  RESTING  HAND  SPLINT   NEEDS  IT  SENT  TO  [facility]   FAX  NUMBER  IS  [id number]   PHONE  NUMBER   [phone]

## 2022-01-01 ENCOUNTER — HOME CARE VISIT (OUTPATIENT)
Dept: HOME HEALTH SERVICES | Facility: HOME HEALTHCARE | Age: 82
End: 2022-01-01
Payer: MEDICARE

## 2022-01-01 ENCOUNTER — TELEPHONE (OUTPATIENT)
Dept: INTERNAL MEDICINE CLINIC | Facility: CLINIC | Age: 82
End: 2022-01-01

## 2022-01-01 ENCOUNTER — HOME CARE VISIT (OUTPATIENT)
Dept: HOME HOSPICE | Facility: HOSPICE | Age: 82
End: 2022-01-01
Payer: MEDICARE

## 2022-01-01 ENCOUNTER — OFFICE VISIT (OUTPATIENT)
Dept: NEUROLOGY | Facility: CLINIC | Age: 82
End: 2022-01-01
Payer: MEDICARE

## 2022-01-01 ENCOUNTER — HOSPICE ADMISSION (OUTPATIENT)
Dept: HOME HOSPICE | Facility: HOSPICE | Age: 82
End: 2022-01-01
Payer: MEDICARE

## 2022-01-01 ENCOUNTER — TELEPHONE (OUTPATIENT)
Dept: NEUROLOGY | Facility: CLINIC | Age: 82
End: 2022-01-01

## 2022-01-01 VITALS
HEART RATE: 74 BPM | DIASTOLIC BLOOD PRESSURE: 60 MMHG | TEMPERATURE: 96.3 F | RESPIRATION RATE: 16 BRPM | SYSTOLIC BLOOD PRESSURE: 122 MMHG

## 2022-01-01 VITALS
DIASTOLIC BLOOD PRESSURE: 60 MMHG | BODY MASS INDEX: 14.67 KG/M2 | SYSTOLIC BLOOD PRESSURE: 100 MMHG | HEART RATE: 62 BPM | WEIGHT: 68 LBS | HEIGHT: 57 IN

## 2022-01-01 VITALS
HEART RATE: 60 BPM | SYSTOLIC BLOOD PRESSURE: 96 MMHG | TEMPERATURE: 98.2 F | RESPIRATION RATE: 12 BRPM | DIASTOLIC BLOOD PRESSURE: 60 MMHG

## 2022-01-01 DIAGNOSIS — Z00.00 ADULT GENERAL MEDICAL EXAMINATION: ICD-10-CM

## 2022-01-01 DIAGNOSIS — R11.2 NAUSEA AND VOMITING, INTRACTABILITY OF VOMITING NOT SPECIFIED, UNSPECIFIED VOMITING TYPE: Primary | ICD-10-CM

## 2022-01-01 DIAGNOSIS — F41.9 ANXIETY: ICD-10-CM

## 2022-01-01 DIAGNOSIS — F33.2 SEVERE EPISODE OF RECURRENT MAJOR DEPRESSIVE DISORDER, WITHOUT PSYCHOTIC FEATURES (HCC): Primary | ICD-10-CM

## 2022-01-01 DIAGNOSIS — G20 PARKINSON DISEASE (HCC): Primary | Chronic | ICD-10-CM

## 2022-01-01 DIAGNOSIS — Z99.3 WHEELCHAIR BOUND: Chronic | ICD-10-CM

## 2022-01-01 PROCEDURE — T2042 HOSPICE ROUTINE HOME CARE: HCPCS

## 2022-01-01 PROCEDURE — G0156 HHCP-SVS OF AIDE,EA 15 MIN: HCPCS

## 2022-01-01 PROCEDURE — G0299 HHS/HOSPICE OF RN EA 15 MIN: HCPCS

## 2022-01-01 PROCEDURE — 10330087 HSPC SERVICE INTENSITY ADD-ON

## 2022-01-01 PROCEDURE — 99214 OFFICE O/P EST MOD 30 MIN: CPT | Performed by: PSYCHIATRY & NEUROLOGY

## 2022-01-01 PROCEDURE — G0155 HHCP-SVS OF CSW,EA 15 MIN: HCPCS

## 2022-01-01 RX ORDER — LORATADINE 10 MG/1
10 TABLET ORAL DAILY
COMMUNITY

## 2022-01-01 RX ORDER — ACETAMINOPHEN 325 MG/1
325 TABLET ORAL EVERY 6 HOURS PRN
Qty: 60 TABLET | Refills: 5 | Status: SHIPPED | OUTPATIENT
Start: 2022-01-01

## 2022-01-01 RX ORDER — BUSPIRONE HYDROCHLORIDE 5 MG/1
5 TABLET ORAL 2 TIMES DAILY
Qty: 90 TABLET | Refills: 5 | Status: SHIPPED | OUTPATIENT
Start: 2022-01-01

## 2022-01-01 RX ORDER — ONDANSETRON 4 MG/1
4 TABLET, FILM COATED ORAL EVERY 8 HOURS PRN
Qty: 20 TABLET | Refills: 0 | Status: SHIPPED | OUTPATIENT
Start: 2022-01-01

## 2022-03-15 NOTE — TELEPHONE ENCOUNTER
Dr received a physician orders for this pt for medications  Dr Rick Law signed the orders; but Dr Lisset Doty is listed as PCP    She signed the order and the white and yellow copies were mailed back  Once the order sheets are received; the facility will receive a signed copy to put in the pt's chart       Pt is a resident of [place] [address]

## 2022-04-07 NOTE — TELEPHONE ENCOUNTER
Pt's family has requested and order for Palliative Hospice Care Evaluation  Chanda Herbert from [facility] can take the order over the phone if this can be done      Please Advise: [phone]

## 2022-04-07 NOTE — TELEPHONE ENCOUNTER
Mr Alcala Melissa; Any's  called back    He said that their daughter is Power of Yanique- her name is: Bere Bermudez  She's on the communication consent     P#: [phone]

## 2022-04-25 NOTE — PROGRESS NOTES
Viviana Pitts is a [de-identified] y o  female  Chief Complaint   Patient presents with    Parkinson's Disease       Assessment:  1  Parkinson disease ([address] )    2  Anxiety    3  Wheelchair bound        Plan:  Increase Sinemet to 25/100 1 5 tablets 4 times a day  continue with Sinemet controlled release 25/100 mg 1 tablet at night time  Continue with physical therapy and occupational therapy  Patient is refusing swallow evaluation  Follow-up in 3-4 months  Discussion:  Discussed at length patient and her family about the Parkinson's disease, is currently taking the Sinemet 25/100 mg 1/2 tablet twice a day at 6:00 a m  and 11:00 a m  and 1 tablet at 3:00 p m  and 6:00 p m  and she takes a controlled release 1 tablet at nighttime, it does not look like that she is taking the zonisamide, we discussed different options patient is agreeable to increase the Sinemet to 1 5 tablets 4 times a day  Patient has generalized pain for which she is in follow up with her family physician and is not keen to go on any narcotics have advised her to discussed with them and see if she may benefit from low-dose gabapentin, she is also not keen to go for any type of swallowing evaluation I have advised them to take aspiration precautions fall and safety precautions continue with her physical therapy and occupational therapy make sure that she is being turned from side to side in bed and use an air mattress to prevent bedsores as she is mostly wheelchair and bed bound  To keep her blood pressure cholesterol and sugar under control, go to the hospital if has any worsening symptoms and call me otherwise to see me back in 4 months and follow up with other physicians      Subjective:    HPI   Patient is here in follow-up for Parkinson's disease, since her last visit she seems to be doing not too good she was seeing the movement disorder specialist on the tele visits and had last seen me couple of years back, she does have motor fluctuations with some wearing off and dyskinesias, she also has history of severe depression but currently denies that she is depressed and does not have any suicidal or homicidal thoughts, she continues to have tremor in her hands both left and right and she does have some flexion contracture of the left wrist, she has been tried on zonisamide in the past without much benefit  She is mostly wheelchair bound and needs help with all her ADLs, she also has declined typically swallowing evaluation and understand the risks but according to the family she is not choking and looks like that patient is on mechanical soft diet, she feels her tremors are slightly worse at the evening time, no hallucinations sleep is good she is incontinent of her bowel and bladder no other complaint  Vitals:    04/25/22 1517   BP: 100/60   BP Location: Left arm   Patient Position: Sitting   Cuff Size: Child   Pulse: 62   Weight: 30 8 kg (68 lb)   Height: 4' 9" (1 448 m)       Current Medications    Current Outpatient Medications:     acetaminophen (TYLENOL) 325 mg tablet, Take 1 tablet (325 mg total) by mouth every 6 (six) hours as needed for mild pain, Disp: 60 tablet, Rfl: 5    busPIRone (BUSPAR) 5 mg tablet, Take 1 tablet (5 mg total) by mouth 3 (three) times a day, Disp: 90 tablet, Rfl: 5    carbamide peroxide (DEBROX) 6 5 % otic solution, Administer 5 drops to the right ear 2 (two) times a day, Disp: 15 mL, Rfl: 0    carbidopa-levodopa (SINEMET CR)  mg TBCR per ER tablet, Take 1 tablet by mouth daily at bedtime, Disp: 30 tablet, Rfl: 5    carbidopa-levodopa (SINEMET)  mg per tablet, Take 1 tablet by mouth 4 (four) times a day 5 tablets/day   1 5 tab at 6a &11a; 1 tablet at 3p & 6p, Disp: 450 tablet, Rfl: 3    fluticasone (FLONASE) 50 mcg/act nasal spray, 2 sprays into each nostril daily, Disp: 16 g, Rfl: 5    loperamide (IMODIUM) 2 mg capsule, Take 1 capsule (2 mg total) by mouth 3 (three) times a day as needed for diarrhea (Patient taking differently: Take 2 mg by mouth 3 (three) times a day as needed for diarrhea ), Disp: 90 capsule, Rfl: 3    loratadine (CLARITIN) 10 mg tablet, Take 10 mg by mouth daily, Disp: , Rfl:     LORazepam (Ativan) 0 5 mg tablet, Take 1 tablet (0 5 mg total) by mouth every 6 (six) hours as needed for anxiety, Disp: 30 tablet, Rfl: 3    melatonin 3 mg, Take 2 tablets (6 mg total) by mouth daily at bedtime, Disp: 60 tablet, Rfl: 5    Morphine Sulfate, Concentrate, 20 mg/mL concentrated solution, Take 0 25 mL (5 mg total) by mouth every 3 (three) hours as needed for severe pain (or respiratory distress) Max Daily Amount: 40 mg, Disp: 30 mL, Rfl: 0    ondansetron (ZOFRAN) 4 mg tablet, Take 1 tablet (4 mg total) by mouth every 8 (eight) hours as needed for nausea or vomiting, Disp: 20 tablet, Rfl: 0    propranolol (INDERAL) 20 mg tablet, Take 1 tablet (20 mg total) by mouth 2 (two) times a day, Disp: 60 tablet, Rfl: 5    sertraline (ZOLOFT) 100 mg tablet, Take 1 tablet (100 mg total) by mouth daily, Disp: 90 tablet, Rfl: 1    thiamine (VITAMIN B1) 100 mg tablet, Take 1 tablet (100 mg total) by mouth daily, Disp: 30 tablet, Rfl: 5    zinc sulfate (ZINCATE) 220 mg capsule, Take 1 capsule (220 mg total) by mouth daily, Disp: 30 capsule, Rfl: 5    zonisamide (ZONEGRAN) 25 mg capsule, Take 1 capsule (25 mg total) by mouth daily at bedtime, Disp: 30 capsule, Rfl: 1      Allergies  Penicillins    Past Medical History  Past Medical History:   Diagnosis Date    Anxiety     Depressed     Disease of thyroid gland     Gait disturbance     Parkinson disease (HCC)          Past Surgical History:  Past Surgical History:   Procedure Laterality Date    SPINAL FUSION  1960         Family History:  Family History   Problem Relation Age of Onset    Parkinsonism Family        Social History:   reports that she has never smoked  She has never used smokeless tobacco  She reports previous alcohol use   She reports previous drug use  I have reviewed the past medical history, surgical history, social and family history, current medications, allergies vitals, review of systems, and updated this information as appropriate today  Objective:    Physical Exam    Neurological Exam    GENERAL:  Cooperative in no acute distress  Well-developed and well-nourished    HEAD and NECK   Head is atraumatic normocephalic with no lesions or masses  Neck is supple with full range of motion    CARDIOVASCULAR  Carotid Arteries-no carotid bruits  NEUROLOGIC:  Mental Status-the patient is awake alert and oriented without aphasia or apraxia, patient does have hypophonia  Cranial Nerves: Visual fields are full to confrontation  Extraocular movements are full without nystagmus  Pupils are 2-1/2 mm and reactive  Face is symmetrical to light touch  Movements of facial expression move symmetrically  Hearing is normal to finger rub bilaterally  Soft palate lifts symmetrically  Shoulder shrug is symmetrical  Tongue is midline without atrophy  Motor:  Patient's strength is 4/5 proximally and distally in upper extremities with weakness of the left hand bilateral lower extremity strength is 3/5 with poor effort in lower extremities she does have flexion contracture of the left wrist and cogwheeling rigidity  Resting tremor of the left hand worse than the right hand  Patient is wheelchair-bound          ROS:  Review of Systems   Constitutional: Negative  Negative for appetite change and fever  HENT: Positive for trouble swallowing  Negative for hearing loss, tinnitus and voice change  Eyes: Negative  Negative for photophobia and pain  Respiratory: Negative  Negative for shortness of breath  Cardiovascular: Negative  Negative for palpitations  Gastrointestinal: Negative  Negative for nausea and vomiting  Endocrine: Negative  Negative for cold intolerance  Genitourinary: Negative  Negative for dysuria, frequency and urgency  Musculoskeletal: Positive for gait problem (not walking)  Negative for myalgias and neck pain  Balance issues  Involuntary movements     Skin: Negative  Negative for rash  Allergic/Immunologic: Negative  Neurological: Negative for dizziness, tremors, seizures, syncope, facial asymmetry, speech difficulty, weakness, light-headedness, numbness and headaches  Hematological: Negative  Does not bruise/bleed easily  Psychiatric/Behavioral: Negative  Negative for confusion, hallucinations and sleep disturbance  All other systems reviewed and are negative

## 2022-05-21 NOTE — HOSPICE
Medication name: Morphine 20mg/mL   Medication count: 29 75mL  Medication name: Lorazepam 0 5mg tablets    Medication count: 10    RN disposed of all controlled substances per protocol    Name of witness: Facility staff Kristy Alonzo  Name and relationship of authorizer  Patient's daughter Jannette Land

## 2022-05-25 VITALS
SYSTOLIC BLOOD PRESSURE: 132 MMHG | HEART RATE: 58 BPM | TEMPERATURE: 98.7 F | DIASTOLIC BLOOD PRESSURE: 80 MMHG | RESPIRATION RATE: 20 BRPM